# Patient Record
Sex: MALE | Race: WHITE | NOT HISPANIC OR LATINO | URBAN - METROPOLITAN AREA
[De-identification: names, ages, dates, MRNs, and addresses within clinical notes are randomized per-mention and may not be internally consistent; named-entity substitution may affect disease eponyms.]

---

## 2021-03-30 ENCOUNTER — EMERGENCY (EMERGENCY)
Facility: HOSPITAL | Age: 51
LOS: 1 days | Discharge: HOME | End: 2021-03-30
Attending: EMERGENCY MEDICINE
Payer: MEDICARE

## 2021-03-30 VITALS
WEIGHT: 169.98 LBS | HEART RATE: 84 BPM | DIASTOLIC BLOOD PRESSURE: 80 MMHG | TEMPERATURE: 99 F | HEIGHT: 71 IN | OXYGEN SATURATION: 99 % | SYSTOLIC BLOOD PRESSURE: 161 MMHG | RESPIRATION RATE: 18 BRPM

## 2021-03-30 VITALS
OXYGEN SATURATION: 99 % | DIASTOLIC BLOOD PRESSURE: 84 MMHG | HEART RATE: 70 BPM | RESPIRATION RATE: 18 BRPM | TEMPERATURE: 98 F | SYSTOLIC BLOOD PRESSURE: 170 MMHG

## 2021-03-30 LAB
ALBUMIN SERPL ELPH-MCNC: 4.8 G/DL — SIGNIFICANT CHANGE UP (ref 3.3–5)
ALP SERPL-CCNC: 59 U/L — SIGNIFICANT CHANGE UP (ref 40–120)
ALT FLD-CCNC: 13 U/L — SIGNIFICANT CHANGE UP (ref 10–45)
ANION GAP SERPL CALC-SCNC: 11 MMOL/L — SIGNIFICANT CHANGE UP (ref 5–17)
AST SERPL-CCNC: 26 U/L — SIGNIFICANT CHANGE UP (ref 10–40)
BASOPHILS # BLD AUTO: 0.05 K/UL — SIGNIFICANT CHANGE UP (ref 0–0.2)
BASOPHILS NFR BLD AUTO: 0.9 % — SIGNIFICANT CHANGE UP (ref 0–2)
BILIRUB SERPL-MCNC: 0.4 MG/DL — SIGNIFICANT CHANGE UP (ref 0.2–1.2)
BUN SERPL-MCNC: 20 MG/DL — SIGNIFICANT CHANGE UP (ref 7–23)
CALCIUM SERPL-MCNC: 9.7 MG/DL — SIGNIFICANT CHANGE UP (ref 8.4–10.5)
CHLORIDE SERPL-SCNC: 98 MMOL/L — SIGNIFICANT CHANGE UP (ref 96–108)
CO2 SERPL-SCNC: 29 MMOL/L — SIGNIFICANT CHANGE UP (ref 22–31)
CREAT SERPL-MCNC: 1.08 MG/DL — SIGNIFICANT CHANGE UP (ref 0.5–1.3)
EOSINOPHIL # BLD AUTO: 0.13 K/UL — SIGNIFICANT CHANGE UP (ref 0–0.5)
EOSINOPHIL NFR BLD AUTO: 2.2 % — SIGNIFICANT CHANGE UP (ref 0–6)
GLUCOSE SERPL-MCNC: 110 MG/DL — HIGH (ref 70–99)
HCT VFR BLD CALC: 37.5 % — LOW (ref 39–50)
HGB BLD-MCNC: 12.5 G/DL — LOW (ref 13–17)
IMM GRANULOCYTES NFR BLD AUTO: 0.3 % — SIGNIFICANT CHANGE UP (ref 0–1.5)
LYMPHOCYTES # BLD AUTO: 1.28 K/UL — SIGNIFICANT CHANGE UP (ref 1–3.3)
LYMPHOCYTES # BLD AUTO: 22.1 % — SIGNIFICANT CHANGE UP (ref 13–44)
MCHC RBC-ENTMCNC: 30.5 PG — SIGNIFICANT CHANGE UP (ref 27–34)
MCHC RBC-ENTMCNC: 33.3 GM/DL — SIGNIFICANT CHANGE UP (ref 32–36)
MCV RBC AUTO: 91.5 FL — SIGNIFICANT CHANGE UP (ref 80–100)
MONOCYTES # BLD AUTO: 0.43 K/UL — SIGNIFICANT CHANGE UP (ref 0–0.9)
MONOCYTES NFR BLD AUTO: 7.4 % — SIGNIFICANT CHANGE UP (ref 2–14)
NEUTROPHILS # BLD AUTO: 3.87 K/UL — SIGNIFICANT CHANGE UP (ref 1.8–7.4)
NEUTROPHILS NFR BLD AUTO: 67.1 % — SIGNIFICANT CHANGE UP (ref 43–77)
NRBC # BLD: 0 /100 WBCS — SIGNIFICANT CHANGE UP (ref 0–0)
PLATELET # BLD AUTO: 250 K/UL — SIGNIFICANT CHANGE UP (ref 150–400)
POTASSIUM SERPL-MCNC: 4.5 MMOL/L — SIGNIFICANT CHANGE UP (ref 3.5–5.3)
POTASSIUM SERPL-SCNC: 4.5 MMOL/L — SIGNIFICANT CHANGE UP (ref 3.5–5.3)
PROT SERPL-MCNC: 8.2 G/DL — SIGNIFICANT CHANGE UP (ref 6–8.3)
RBC # BLD: 4.1 M/UL — LOW (ref 4.2–5.8)
RBC # FLD: 12.4 % — SIGNIFICANT CHANGE UP (ref 10.3–14.5)
SODIUM SERPL-SCNC: 138 MMOL/L — SIGNIFICANT CHANGE UP (ref 135–145)
WBC # BLD: 5.78 K/UL — SIGNIFICANT CHANGE UP (ref 3.8–10.5)
WBC # FLD AUTO: 5.78 K/UL — SIGNIFICANT CHANGE UP (ref 3.8–10.5)

## 2021-03-30 PROCEDURE — 99284 EMERGENCY DEPT VISIT MOD MDM: CPT | Mod: 25

## 2021-03-30 PROCEDURE — 96375 TX/PRO/DX INJ NEW DRUG ADDON: CPT

## 2021-03-30 PROCEDURE — 99284 EMERGENCY DEPT VISIT MOD MDM: CPT | Mod: GC

## 2021-03-30 PROCEDURE — 96374 THER/PROPH/DIAG INJ IV PUSH: CPT

## 2021-03-30 PROCEDURE — 85025 COMPLETE CBC W/AUTO DIFF WBC: CPT

## 2021-03-30 PROCEDURE — 80053 COMPREHEN METABOLIC PANEL: CPT

## 2021-03-30 RX ORDER — METOCLOPRAMIDE HCL 10 MG
10 TABLET ORAL ONCE
Refills: 0 | Status: COMPLETED | OUTPATIENT
Start: 2021-03-30 | End: 2021-03-30

## 2021-03-30 RX ORDER — SODIUM CHLORIDE 9 MG/ML
1000 INJECTION INTRAMUSCULAR; INTRAVENOUS; SUBCUTANEOUS ONCE
Refills: 0 | Status: COMPLETED | OUTPATIENT
Start: 2021-03-30 | End: 2021-03-30

## 2021-03-30 RX ORDER — KETOROLAC TROMETHAMINE 30 MG/ML
15 SYRINGE (ML) INJECTION ONCE
Refills: 0 | Status: DISCONTINUED | OUTPATIENT
Start: 2021-03-30 | End: 2021-03-30

## 2021-03-30 RX ADMIN — Medication 15 MILLIGRAM(S): at 17:10

## 2021-03-30 RX ADMIN — SODIUM CHLORIDE 2000 MILLILITER(S): 9 INJECTION INTRAMUSCULAR; INTRAVENOUS; SUBCUTANEOUS at 20:06

## 2021-03-30 RX ADMIN — Medication 10 MILLIGRAM(S): at 20:06

## 2021-03-30 NOTE — ED PROVIDER NOTE - NSFOLLOWUPCLINICS_GEN_ALL_ED_FT
[facility]  Neurology  [address] [place] [address] [address]rd [place] [address]  Phone: [phone]  Fax:

## 2021-03-30 NOTE — ED ADULT NURSE NOTE - NSIMPLEMENTINTERV_GEN_ALL_ED
Implemented All Universal Safety Interventions:  [place] to call system. Call bell, personal items and telephone within reach. Instruct patient to call for assistance. Room bathroom lighting operational. Non-slip footwear when patient is off stretcher. Physically safe environment: no spills, clutter or unnecessary equipment. Stretcher in lowest position, wheels locked, appropriate side rails in place.

## 2021-03-30 NOTE — ED PROVIDER NOTE - PHYSICAL EXAMINATION
gen: well appearing  Mentation: AAO x 3  psych: mood appropriate  ENT: airway patent  Eyes: conjunctivae clear bilaterally  Cardio: RRR, no m/r/g  Resp: normal BS b/l  GI: s/nt/nd  : no CVA tenderness  Neuro: sensation and motor function intact, CN 2-12 intact, no nuchal rigidity  Skin: No evidence of rash  MSK: normal movement of all extremities  Lymph/Vasc: no LE edema

## 2021-03-30 NOTE — ED PROVIDER NOTE - OBJECTIVE STATEMENT
49 y/o M with no PMH presenting with headache for last 4 days. Headache is bandlike, feels worse if he shakes his head. States has been progressively worsening, no relief with tylenol at home. Denies any associated n/v, double vision/vision changes, neck stiffness, numbness/weakness. No LOC

## 2021-03-30 NOTE — ED ADULT NURSE NOTE - OBJECTIVE STATEMENT
Pt is a 50y M p/w headache x4 days. Pt reports headache worse on movement of eyes and head 8/10. Reports that he has never had headaches before. Pt has been taking tylenol outpatient but pain has been worsening since yesterday. Denies N/V/D, photophobia, chest pain, SOB, numbness, tingling. A&Ox4, neuro exam intact, PORTER, lungs clear, distal pulses intact, abdomen soft nontender, skin intact. One side rail up for safety, bed in lowest position, will continue to monitor.

## 2021-03-30 NOTE — ED PROVIDER NOTE - CLINICAL SUMMARY MEDICAL DECISION MAKING FREE TEXT BOX
DO Lissette PGY-2: 49 y/o M with no PMH presenting with headache, some relief with IV meds here. No red flags on exam, will DC home with supportive care

## 2021-03-30 NOTE — ED PROVIDER NOTE - RAPID ASSESSMENT
50 M presents with gradual onset Headache x4 days. Reports pain worsened with eye movement. States unable to focus. Of note, pt admits to sporadic episodes of anxiety attacks for the past 4 months. Denies head injury, fever, vomiting, visual changes, or COVID hx. Last endorsed Tylenol at 12pm.     Scribe Statement: Joan NULL Tiffany, attest that this documentation has been prepared under the direction and in the presence of Mo Berry) 50 M presents with gradual onset Headache x4 days. Reports pain worsened with eye movement. States unable to focus. Of note, pt admits to sporadic episodes of anxiety attacks for the past 4 months. Denies head injury, fever, vomiting, visual changes, or COVID hx. Last endorsed Tylenol at 12pm.     Scribe Statement: Joan NULL Tiffany, attest that this documentation has been prepared under the direction and in the presence of Mo Berry)  I, Dr. Berry saw patient as a rapid assessment initially via telemedicine encounter, rest of care performed by another attending, and rapid assessment documented by scribe in my presence. Receiving team will follow up on labs, analgesia, any clinical imaging, and perform reassessment and disposition of the patient as clinically indicated. All decisions regarding the progression of care will be made at their discretion.

## 2021-03-30 NOTE — ED PROVIDER NOTE - PATIENT PORTAL LINK FT
You can access the FollowMyHealth Patient Portal offered by [facility] by registering at the following website: [email or web address]. By joining [employer]’s FollowMyHealth portal, you will also be able to view your health information using other applications (apps) compatible with our system.

## 2021-03-30 NOTE — ED PROVIDER NOTE - ATTENDING CONTRIBUTION TO CARE
RGUJRAL 51yo male no pmhx present with HA x 4-5 days. HA is dull achy and frontal. No change in vision, n/v. States it hurts to move his eyes. Patient also states that he has been having increased anxiety and panic attacks that are self limiting. Denies any SI/HI. No hallucination. Denies any trauma, cough, URI, f/c, neck pain. No chest/abd pain.   On exam, Patient is awake,alert,oriented x 3. Patient is well appearing and in no acute distress. No sinus tenderness. Full EOMI. PERRL 4mm b/l reactive. Patient's chest is clear to ausculation, +s1s2. Abdomen is soft nd/nt +BS. Extremity with no swelling or calf tenderness. Pt neuro intact.   Pt qdoc and reviewed labs, states he feels slightly better and would like to leave. Discussed getting a CT Head, pt declined and states if his symptoms persist he will return to a hospital. Neuro follow up and return precautions discussed.

## 2022-04-15 PROBLEM — Z00.00 ENCOUNTER FOR PREVENTIVE HEALTH EXAMINATION: Status: ACTIVE | Noted: 2022-04-15

## 2022-04-25 ENCOUNTER — RESULT REVIEW (OUTPATIENT)
Age: 52
End: 2022-04-25

## 2022-04-25 ENCOUNTER — APPOINTMENT (OUTPATIENT)
Dept: SPINE | Facility: CLINIC | Age: 52
End: 2022-04-25
Payer: MEDICARE

## 2022-04-25 VITALS
HEART RATE: 95 BPM | SYSTOLIC BLOOD PRESSURE: 133 MMHG | BODY MASS INDEX: 23.1 KG/M2 | WEIGHT: 165 LBS | OXYGEN SATURATION: 67 % | HEIGHT: 71 IN | DIASTOLIC BLOOD PRESSURE: 74 MMHG

## 2022-04-25 DIAGNOSIS — Z72.0 TOBACCO USE: ICD-10-CM

## 2022-04-25 DIAGNOSIS — M47.12 OTHER SPONDYLOSIS WITH MYELOPATHY, CERVICAL REGION: ICD-10-CM

## 2022-04-25 DIAGNOSIS — Z78.9 OTHER SPECIFIED HEALTH STATUS: ICD-10-CM

## 2022-04-25 PROCEDURE — 99204 OFFICE O/P NEW MOD 45 MIN: CPT

## 2022-04-26 PROBLEM — Z78.9 NO PERTINENT PAST MEDICAL HISTORY: Status: RESOLVED | Noted: 2022-04-25 | Resolved: 2022-04-26

## 2022-04-26 PROBLEM — Z72.0 USE OF NICOTINE CONTAINING SUBSTANCE IN COMBUSTION-FREE VAPORIZATION DEVICE: Status: ACTIVE | Noted: 2022-04-25

## 2022-04-26 RX ORDER — ACETAMINOPHEN 500 MG/1
500 TABLET, COATED ORAL
Refills: 0 | Status: ACTIVE | COMMUNITY

## 2022-04-29 ENCOUNTER — OUTPATIENT (OUTPATIENT)
Dept: OUTPATIENT SERVICES | Facility: HOSPITAL | Age: 52
LOS: 1 days | End: 2022-04-29
Payer: MEDICARE

## 2022-04-29 VITALS
DIASTOLIC BLOOD PRESSURE: 81 MMHG | SYSTOLIC BLOOD PRESSURE: 133 MMHG | HEIGHT: 71 IN | RESPIRATION RATE: 20 BRPM | OXYGEN SATURATION: 100 % | HEART RATE: 70 BPM | TEMPERATURE: 98 F | WEIGHT: 160.94 LBS

## 2022-04-29 DIAGNOSIS — G95.9 DISEASE OF SPINAL CORD, UNSPECIFIED: ICD-10-CM

## 2022-04-29 DIAGNOSIS — Z98.890 OTHER SPECIFIED POSTPROCEDURAL STATES: Chronic | ICD-10-CM

## 2022-04-29 DIAGNOSIS — Z29.9 ENCOUNTER FOR PROPHYLACTIC MEASURES, UNSPECIFIED: ICD-10-CM

## 2022-04-29 DIAGNOSIS — Z01.818 ENCOUNTER FOR OTHER PREPROCEDURAL EXAMINATION: ICD-10-CM

## 2022-04-29 LAB
ANION GAP SERPL CALC-SCNC: 12 MMOL/L — SIGNIFICANT CHANGE UP (ref 5–17)
BLD GP AB SCN SERPL QL: NEGATIVE — SIGNIFICANT CHANGE UP
BUN SERPL-MCNC: 19 MG/DL — SIGNIFICANT CHANGE UP (ref 7–23)
CALCIUM SERPL-MCNC: 9.6 MG/DL — SIGNIFICANT CHANGE UP (ref 8.4–10.5)
CHLORIDE SERPL-SCNC: 101 MMOL/L — SIGNIFICANT CHANGE UP (ref 96–108)
CO2 SERPL-SCNC: 27 MMOL/L — SIGNIFICANT CHANGE UP (ref 22–31)
CREAT SERPL-MCNC: 0.96 MG/DL — SIGNIFICANT CHANGE UP (ref 0.5–1.3)
EGFR: 95 ML/MIN/1.73M2 — SIGNIFICANT CHANGE UP
GLUCOSE SERPL-MCNC: 116 MG/DL — HIGH (ref 70–99)
HCT VFR BLD CALC: 39 % — SIGNIFICANT CHANGE UP (ref 39–50)
HCV AB S/CO SERPL IA: 0.49 S/CO — SIGNIFICANT CHANGE UP (ref 0–0.99)
HCV AB SERPL-IMP: SIGNIFICANT CHANGE UP
HGB BLD-MCNC: 12.9 G/DL — LOW (ref 13–17)
HIV 1+2 AB+HIV1 P24 AG SERPL QL IA: SIGNIFICANT CHANGE UP
MCHC RBC-ENTMCNC: 29.9 PG — SIGNIFICANT CHANGE UP (ref 27–34)
MCHC RBC-ENTMCNC: 33.1 GM/DL — SIGNIFICANT CHANGE UP (ref 32–36)
MCV RBC AUTO: 90.3 FL — SIGNIFICANT CHANGE UP (ref 80–100)
MRSA PCR RESULT.: SIGNIFICANT CHANGE UP
NRBC # BLD: 0 /100 WBCS — SIGNIFICANT CHANGE UP (ref 0–0)
PLATELET # BLD AUTO: 243 K/UL — SIGNIFICANT CHANGE UP (ref 150–400)
POTASSIUM SERPL-MCNC: 4 MMOL/L — SIGNIFICANT CHANGE UP (ref 3.5–5.3)
POTASSIUM SERPL-SCNC: 4 MMOL/L — SIGNIFICANT CHANGE UP (ref 3.5–5.3)
RBC # BLD: 4.32 M/UL — SIGNIFICANT CHANGE UP (ref 4.2–5.8)
RBC # FLD: 12.2 % — SIGNIFICANT CHANGE UP (ref 10.3–14.5)
RH IG SCN BLD-IMP: POSITIVE — SIGNIFICANT CHANGE UP
S AUREUS DNA NOSE QL NAA+PROBE: DETECTED
SODIUM SERPL-SCNC: 140 MMOL/L — SIGNIFICANT CHANGE UP (ref 135–145)
WBC # BLD: 4.68 K/UL — SIGNIFICANT CHANGE UP (ref 3.8–10.5)
WBC # FLD AUTO: 4.68 K/UL — SIGNIFICANT CHANGE UP (ref 3.8–10.5)

## 2022-04-29 PROCEDURE — 87389 HIV-1 AG W/HIV-1&-2 AB AG IA: CPT

## 2022-04-29 PROCEDURE — 80048 BASIC METABOLIC PNL TOTAL CA: CPT

## 2022-04-29 PROCEDURE — 86900 BLOOD TYPING SEROLOGIC ABO: CPT

## 2022-04-29 PROCEDURE — G0463: CPT

## 2022-04-29 PROCEDURE — 36415 COLL VENOUS BLD VENIPUNCTURE: CPT

## 2022-04-29 PROCEDURE — 86850 RBC ANTIBODY SCREEN: CPT

## 2022-04-29 PROCEDURE — 87640 STAPH A DNA AMP PROBE: CPT

## 2022-04-29 PROCEDURE — 86803 HEPATITIS C AB TEST: CPT

## 2022-04-29 PROCEDURE — 87641 MR-STAPH DNA AMP PROBE: CPT

## 2022-04-29 PROCEDURE — 85027 COMPLETE CBC AUTOMATED: CPT

## 2022-04-29 PROCEDURE — 86901 BLOOD TYPING SEROLOGIC RH(D): CPT

## 2022-04-29 NOTE — H&P PST ADULT - ASSESSMENT
PAULI VTE 2.0 SCORE [CLOT updated 2019]    AGE RELATED RISK FACTORS                                                       MOBILITY RELATED FACTORS  [x ] Age 41-60 years                                            (1 Point)                    [ ] Bed rest                                                        (1 Point)  [ ] Age: 61-74 years                                           (2 Points)                  [ ] Plaster cast                                                   (2 Points)  [ ] Age= 75 years                                              (3 Points)                    [ ] Bed bound for more than 72 hours                 (2 Points)    DISEASE RELATED RISK FACTORS                                               GENDER SPECIFIC FACTORS  [ ] Edema in the lower extremities                       (1 Point)              [ ] Pregnancy                                                     (1 Point)  [ ] Varicose veins                                               (1 Point)                     [ ] Post-partum < 6 weeks                                   (1 Point)             [ ] BMI > 25 Kg/m2                                            (1 Point)                     [ ] Hormonal therapy  or oral contraception          (1 Point)                 [ ] Sepsis (in the previous month)                        (1 Point)               [ ] History of pregnancy complications                 (1 point)  [ ] Pneumonia or serious lung disease                                               [ ] Unexplained or recurrent                     (1 Point)           (in the previous month)                               (1 Point)  [ ] Abnormal pulmonary function test                     (1 Point)                 SURGERY RELATED RISK FACTORS  [ ] Acute myocardial infarction                              (1 Point)               [ ]  Section                                             (1 Point)  [ ] Congestive heart failure (in the previous month)  (1 Point)      [ ] Minor surgery                                                  (1 Point)   [ ] Inflammatory bowel disease                             (1 Point)               [ ] Arthroscopic surgery                                        (2 Points)  [ ] Central venous access                                      (2 Points)                [x ] General surgery lasting more than 45 minutes (2 points)  [ ] Malignancy- Present or previous                   (2 Points)                [ ] Elective arthroplasty                                         (5 points)    [ ] Stroke (in the previous month)                          (5 Points)                                                                                                                                                           HEMATOLOGY RELATED FACTORS                                                 TRAUMA RELATED RISK FACTORS  [ ] Prior episodes of VTE                                     (3 Points)                [ ] Fracture of the hip, pelvis, or leg                       (5 Points)  [ ] Positive family history for VTE                         (3 Points)             [ ] Acute spinal cord injury (in the previous month)  (5 Points)  [ ] Prothrombin 24490 A                                     (3 Points)               [ ] Paralysis  (less than 1 month)                             (5 Points)  [ ] Factor V Leiden                                             (3 Points)                  [ ] Multiple Trauma within 1 month                        (5 Points)  [ ] Lupus anticoagulants                                     (3 Points)                                                           [ ] Anticardiolipin antibodies                               (3 Points)                                                       [ ] High homocysteine in the blood                      (3 Points)                                             [ ] Other congenital or acquired thrombophilia      (3 Points)                                                [ ] Heparin induced thrombocytopenia                  (3 Points)                                     Total Score [   3      ]

## 2022-04-29 NOTE — H&P PST ADULT - NSANTHOSAYNRD_GEN_A_CORE
16-Aug-2018 No. TED screening performed.  STOP BANG Legend: 0-2 = LOW Risk; 3-4 = INTERMEDIATE Risk; 5-8 = HIGH Risk

## 2022-04-29 NOTE — H&P PST ADULT - NSICDXPASTMEDICALHX_GEN_ALL_CORE_FT
PAST MEDICAL HISTORY:  Osteomyelitis of vertebra, lumbar region x 12 yrs    Vapes nicotine containing substance

## 2022-04-29 NOTE — H&P PST ADULT - REASON FOR ADMISSION
I have herniated disc in neck  GOAL: to have better range of motion in right arm with better strength

## 2022-04-29 NOTE — H&P PST ADULT - HISTORY OF PRESENT ILLNESS
52 yo male  with muscle atrophy, and decreased ROM on right UE and bicep, and tingling, MRI demonstrates spinal stenosis in cervical spine. now for surgical intervention. pT s/p lumbar laminectomy x5 with course of osteomyelitis resulting from injury with construction nail.           50 yo male  with muscle atrophy, and decreased ROM on right UE and bicep, and tingling, MRI demonstrates spinal stenosis in cervical spine. now for surgical intervention. pT s/p lumbar laminectomy x5 with course of osteomyelitis resulting from injury with construction nail.      COVID swab 5/8/22 [place]

## 2022-04-29 NOTE — H&P PST ADULT - NSICDXPASTSURGICALHX_GEN_ALL_CORE_FT
PAST SURGICAL HISTORY:  S/P left knee arthroscopy bilateral in 2010    S/P lumbar laminectomy x5

## 2022-05-02 ENCOUNTER — OUTPATIENT (OUTPATIENT)
Dept: OUTPATIENT SERVICES | Facility: HOSPITAL | Age: 52
LOS: 1 days | End: 2022-05-02
Payer: MEDICARE

## 2022-05-02 ENCOUNTER — APPOINTMENT (OUTPATIENT)
Dept: RADIOLOGY | Facility: CLINIC | Age: 52
End: 2022-05-02
Payer: MEDICARE

## 2022-05-02 ENCOUNTER — APPOINTMENT (OUTPATIENT)
Dept: CT IMAGING | Facility: CLINIC | Age: 52
End: 2022-05-02
Payer: MEDICARE

## 2022-05-02 DIAGNOSIS — Z98.890 OTHER SPECIFIED POSTPROCEDURAL STATES: Chronic | ICD-10-CM

## 2022-05-02 DIAGNOSIS — G95.9 DISEASE OF SPINAL CORD, UNSPECIFIED: ICD-10-CM

## 2022-05-02 PROBLEM — M46.26 OSTEOMYELITIS OF VERTEBRA, LUMBAR REGION: Chronic | Status: ACTIVE | Noted: 2022-04-29

## 2022-05-02 PROBLEM — Z72.0 TOBACCO USE: Chronic | Status: ACTIVE | Noted: 2022-04-29

## 2022-05-02 PROCEDURE — G1004: CPT

## 2022-05-02 PROCEDURE — 72052 X-RAY EXAM NECK SPINE 6/>VWS: CPT | Mod: 26

## 2022-05-02 PROCEDURE — 72125 CT NECK SPINE W/O DYE: CPT | Mod: 26,ME

## 2022-05-02 PROCEDURE — 72052 X-RAY EXAM NECK SPINE 6/>VWS: CPT

## 2022-05-02 PROCEDURE — 72125 CT NECK SPINE W/O DYE: CPT | Mod: ME

## 2022-05-03 ENCOUNTER — NON-APPOINTMENT (OUTPATIENT)
Age: 52
End: 2022-05-03

## 2022-05-04 NOTE — PHYSICAL EXAM
[Person] : oriented to person [Place] : oriented to place [Time] : oriented to time [Short Term Intact] : short term memory intact [Remote Intact] : remote memory intact [Span Intact] : the attention span was normal [Concentration Intact] : normal concentrating ability [Fluency] : fluency intact [Comprehension] : comprehension intact [Current Events] : adequate knowledge of current events [Past History] : adequate knowledge of personal past history [Vocabulary] : adequate range of vocabulary [Cranial Nerves Optic (II)] : visual acuity intact bilaterally,  pupils equal round and reactive to light [Cranial Nerves Oculomotor (III)] : extraocular motion intact [Cranial Nerves Trigeminal (V)] : facial sensation intact symmetrically [Cranial Nerves Facial (VII)] : face symmetrical [Cranial Nerves Vestibulocochlear (VIII)] : hearing was intact bilaterally [Cranial Nerves Glossopharyngeal (IX)] : tongue and palate midline [Cranial Nerves Accessory (XI - Cranial And Spinal)] : head turning and shoulder shrug symmetric [Cranial Nerves Hypoglossal (XII)] : there was no tongue deviation with protrusion [Motor Tone] : muscle tone was normal in all four extremities [Motor Strength] : muscle strength was normal in all four extremities [No Muscle Atrophy] : normal bulk in all four extremities [Sensation Tactile Decrease] : light touch was intact [Abnormal Walk] : normal gait [Balance] : balance was intact [2+] : Ankle jerk left 2+ [Past-pointing] : there was no past-pointing [Tremor] : no tremor present [Plantar Reflex Right Only] : normal on the right [Plantar Reflex Left Only] : normal on the left [Henley] : Henley's sign was not demonstrated [FreeTextEntry6] : right  and proximal lower extremity weakness bilateral 4/5 [L'Hermitte's] : neck flexion did not produce tingling down the spine/arms [Spurling's - Opposite Side] : Negative Spurling's on opposite side [Spurling's Same Side] : Negative Spurling's on same side

## 2022-05-04 NOTE — HISTORY OF PRESENT ILLNESS
[> 3 months] : more  than 3 months [FreeTextEntry1] : Right arm weakness [de-identified] : Mr. Quintero is a 51 year old gentleman here for a neurological evaluation. He presents today with limited ROM of right arm and weakness for two years. Weakness of right arm has gotten progressively worse over the last year. He has difficulty putting on his belt secondary to limited range of motion in right arm. He has right deltoid and bicep atrophy. Ambulates independently without any assistive devices. Pt reports occasional neck pain. Takes Tylenol as needed for pain. No non surgical treatments has been initiated. Denies any balance issues, urinary or bowel incontinence. Works in construction. Vaps nicotine every day.  Cervical MRI reviewed today. MRI scan shows severe spinal stenosis at C3-C6 secondary to disc osteophyte complexes. There is evidence of T2 signal change in the cord.

## 2022-05-04 NOTE — CONSULT LETTER
[Dear  ___] : Dear  [unfilled], [Consult Letter:] : I had the pleasure of evaluating your patient, [unfilled]. [Please see my note below.] : Please see my note below. [Consult Closing:] : Thank you very much for allowing me to participate in the care of this patient.  If you have any questions, please do not hesitate to contact me. [Sincerely,] : Sincerely, [FreeTextEntry3] : Gatito Bejarano MD\par Chief of Neurosurgery [facility]\par [facility]\par

## 2022-05-04 NOTE — ASSESSMENT
[FreeTextEntry1] : 51 year old with progressively worsening right arm weakness. ACDF C4,5  TDR C3,4 , C5-6 surgery was discussed. Surgery was a result of shared decision making, all surgical options were reviewed. Risks related of surgery were discussed in detail - including but not limited to CSF leak, infection, nerve damage, temporary or permanent weakness or numbness, hemorrhage, cardiac events, and rarely even death, among others. Repair of CSF leak explained. An explanation of hardware/instrumentation that will be placed in the spine was reviewed and understood. It was discussed surgery will stop neurological impairment from worsening. Smoking cessation was discussed. CT Scan and Xray Flex/Ext of Cervical spine ordered for surgical planning.  I evaluated and examined this patient along with the nurse practitioner and we agreed on her plan of care.\par \par \par CC\par Dr. Se Daniels

## 2022-05-04 NOTE — REASON FOR VISIT
[New Patient Visit] : a new patient visit [Referred By: _________] : Patient was referred by TAMIKO [Other: _____] : [unfilled] [FreeTextEntry1] : right arm weakness and limited ROM

## 2022-05-08 ENCOUNTER — OUTPATIENT (OUTPATIENT)
Dept: OUTPATIENT SERVICES | Facility: HOSPITAL | Age: 52
LOS: 1 days | End: 2022-05-08

## 2022-05-08 DIAGNOSIS — Z11.52 ENCOUNTER FOR SCREENING FOR COVID-19: ICD-10-CM

## 2022-05-08 DIAGNOSIS — Z98.890 OTHER SPECIFIED POSTPROCEDURAL STATES: Chronic | ICD-10-CM

## 2022-05-08 LAB — SARS-COV-2 RNA SPEC QL NAA+PROBE: SIGNIFICANT CHANGE UP

## 2022-05-10 ENCOUNTER — TRANSCRIPTION ENCOUNTER (OUTPATIENT)
Age: 52
End: 2022-05-10

## 2022-05-11 ENCOUNTER — INPATIENT (INPATIENT)
Facility: HOSPITAL | Age: 52
LOS: 0 days | Discharge: HOME | DRG: 472 | End: 2022-05-12
Attending: NEUROLOGICAL SURGERY | Admitting: NEUROLOGICAL SURGERY
Payer: COMMERCIAL

## 2022-05-11 ENCOUNTER — APPOINTMENT (OUTPATIENT)
Dept: SPINE | Facility: HOSPITAL | Age: 52
End: 2022-05-11

## 2022-05-11 VITALS
HEART RATE: 51 BPM | WEIGHT: 160.94 LBS | HEIGHT: 71 IN | SYSTOLIC BLOOD PRESSURE: 119 MMHG | DIASTOLIC BLOOD PRESSURE: 58 MMHG | OXYGEN SATURATION: 98 % | TEMPERATURE: 98 F | RESPIRATION RATE: 18 BRPM

## 2022-05-11 DIAGNOSIS — Z98.890 OTHER SPECIFIED POSTPROCEDURAL STATES: Chronic | ICD-10-CM

## 2022-05-11 DIAGNOSIS — G95.9 DISEASE OF SPINAL CORD, UNSPECIFIED: ICD-10-CM

## 2022-05-11 LAB
ANION GAP SERPL CALC-SCNC: 15 MMOL/L — SIGNIFICANT CHANGE UP (ref 5–17)
BASOPHILS # BLD AUTO: 0.02 K/UL — SIGNIFICANT CHANGE UP (ref 0–0.2)
BASOPHILS NFR BLD AUTO: 0.3 % — SIGNIFICANT CHANGE UP (ref 0–2)
BUN SERPL-MCNC: 12 MG/DL — SIGNIFICANT CHANGE UP (ref 7–23)
CALCIUM SERPL-MCNC: 8.9 MG/DL — SIGNIFICANT CHANGE UP (ref 8.4–10.5)
CHLORIDE SERPL-SCNC: 101 MMOL/L — SIGNIFICANT CHANGE UP (ref 96–108)
CO2 SERPL-SCNC: 23 MMOL/L — SIGNIFICANT CHANGE UP (ref 22–31)
CREAT SERPL-MCNC: 0.95 MG/DL — SIGNIFICANT CHANGE UP (ref 0.5–1.3)
EGFR: 96 ML/MIN/1.73M2 — SIGNIFICANT CHANGE UP
EOSINOPHIL # BLD AUTO: 0.01 K/UL — SIGNIFICANT CHANGE UP (ref 0–0.5)
EOSINOPHIL NFR BLD AUTO: 0.1 % — SIGNIFICANT CHANGE UP (ref 0–6)
GLUCOSE SERPL-MCNC: 171 MG/DL — HIGH (ref 70–99)
HCT VFR BLD CALC: 34 % — LOW (ref 39–50)
HGB BLD-MCNC: 11.7 G/DL — LOW (ref 13–17)
IMM GRANULOCYTES NFR BLD AUTO: 0.5 % — SIGNIFICANT CHANGE UP (ref 0–1.5)
LYMPHOCYTES # BLD AUTO: 0.45 K/UL — LOW (ref 1–3.3)
LYMPHOCYTES # BLD AUTO: 5.7 % — LOW (ref 13–44)
MCHC RBC-ENTMCNC: 30.1 PG — SIGNIFICANT CHANGE UP (ref 27–34)
MCHC RBC-ENTMCNC: 34.4 GM/DL — SIGNIFICANT CHANGE UP (ref 32–36)
MCV RBC AUTO: 87.4 FL — SIGNIFICANT CHANGE UP (ref 80–100)
MONOCYTES # BLD AUTO: 0.08 K/UL — SIGNIFICANT CHANGE UP (ref 0–0.9)
MONOCYTES NFR BLD AUTO: 1 % — LOW (ref 2–14)
NEUTROPHILS # BLD AUTO: 7.33 K/UL — SIGNIFICANT CHANGE UP (ref 1.8–7.4)
NEUTROPHILS NFR BLD AUTO: 92.4 % — HIGH (ref 43–77)
NRBC # BLD: 0 /100 WBCS — SIGNIFICANT CHANGE UP (ref 0–0)
PLATELET # BLD AUTO: 233 K/UL — SIGNIFICANT CHANGE UP (ref 150–400)
POTASSIUM SERPL-MCNC: 3.4 MMOL/L — LOW (ref 3.5–5.3)
POTASSIUM SERPL-SCNC: 3.4 MMOL/L — LOW (ref 3.5–5.3)
RBC # BLD: 3.89 M/UL — LOW (ref 4.2–5.8)
RBC # FLD: 12.1 % — SIGNIFICANT CHANGE UP (ref 10.3–14.5)
RH IG SCN BLD-IMP: POSITIVE — SIGNIFICANT CHANGE UP
SODIUM SERPL-SCNC: 139 MMOL/L — SIGNIFICANT CHANGE UP (ref 135–145)
WBC # BLD: 7.93 K/UL — SIGNIFICANT CHANGE UP (ref 3.8–10.5)
WBC # FLD AUTO: 7.93 K/UL — SIGNIFICANT CHANGE UP (ref 3.8–10.5)

## 2022-05-11 PROCEDURE — 22551 ARTHRD ANT NTRBDY CERVICAL: CPT | Mod: GC

## 2022-05-11 PROCEDURE — 22552 ARTHRD ANT NTRBD CERVICAL EA: CPT | Mod: GC

## 2022-05-11 PROCEDURE — 22845 INSERT SPINE FIXATION DEVICE: CPT | Mod: 59,GC

## 2022-05-11 PROCEDURE — 22853 INSJ BIOMECHANICAL DEVICE: CPT | Mod: GC

## 2022-05-11 DEVICE — IMPLANTABLE DEVICE: Type: IMPLANTABLE DEVICE | Status: FUNCTIONAL

## 2022-05-11 DEVICE — SURGIFLO MATRIX WITH THROMBIN KIT: Type: IMPLANTABLE DEVICE | Status: FUNCTIONAL

## 2022-05-11 DEVICE — CAGE FORTILINK TETRAFUSE 6 DEG 14.5X17X6MM: Type: IMPLANTABLE DEVICE | Status: FUNCTIONAL

## 2022-05-11 DEVICE — DISTRACTION PIN 14MM (YELLOW): Type: IMPLANTABLE DEVICE | Status: FUNCTIONAL

## 2022-05-11 DEVICE — SURGIFOAM PAD 8CM X 12.5CM X 10MM (100): Type: IMPLANTABLE DEVICE | Status: FUNCTIONAL

## 2022-05-11 DEVICE — KIT A-LINE 1LUM 20G X 12CM SAFE KIT: Type: IMPLANTABLE DEVICE | Status: FUNCTIONAL

## 2022-05-11 RX ORDER — LABETALOL HCL 100 MG
20 TABLET ORAL ONCE
Refills: 0 | Status: COMPLETED | OUTPATIENT
Start: 2022-05-11 | End: 2022-05-11

## 2022-05-11 RX ORDER — GABAPENTIN 400 MG/1
300 CAPSULE ORAL DAILY
Refills: 0 | Status: DISCONTINUED | OUTPATIENT
Start: 2022-05-11 | End: 2022-05-12

## 2022-05-11 RX ORDER — SODIUM CHLORIDE 9 MG/ML
1000 INJECTION INTRAMUSCULAR; INTRAVENOUS; SUBCUTANEOUS
Refills: 0 | Status: DISCONTINUED | OUTPATIENT
Start: 2022-05-11 | End: 2022-05-12

## 2022-05-11 RX ORDER — GABAPENTIN 400 MG/1
0 CAPSULE ORAL
Qty: 0 | Refills: 0 | DISCHARGE

## 2022-05-11 RX ORDER — DEXAMETHASONE 0.5 MG/5ML
4 ELIXIR ORAL EVERY 6 HOURS
Refills: 0 | Status: DISCONTINUED | OUTPATIENT
Start: 2022-05-11 | End: 2022-05-12

## 2022-05-11 RX ORDER — CEFAZOLIN SODIUM 1 G
2000 VIAL (EA) INJECTION EVERY 8 HOURS
Refills: 0 | Status: COMPLETED | OUTPATIENT
Start: 2022-05-11 | End: 2022-05-11

## 2022-05-11 RX ORDER — CEFAZOLIN SODIUM 1 G
2000 VIAL (EA) INJECTION ONCE
Refills: 0 | Status: COMPLETED | OUTPATIENT
Start: 2022-05-11 | End: 2022-05-11

## 2022-05-11 RX ORDER — LABETALOL HCL 100 MG
10 TABLET ORAL ONCE
Refills: 0 | Status: COMPLETED | OUTPATIENT
Start: 2022-05-11 | End: 2022-05-11

## 2022-05-11 RX ORDER — CHLORHEXIDINE GLUCONATE 213 G/1000ML
1 SOLUTION TOPICAL ONCE
Refills: 0 | Status: DISCONTINUED | OUTPATIENT
Start: 2022-05-11 | End: 2022-05-11

## 2022-05-11 RX ORDER — HYDROMORPHONE HYDROCHLORIDE 2 MG/ML
0.5 INJECTION INTRAMUSCULAR; INTRAVENOUS; SUBCUTANEOUS
Refills: 0 | Status: DISCONTINUED | OUTPATIENT
Start: 2022-05-11 | End: 2022-05-11

## 2022-05-11 RX ORDER — OXYCODONE HYDROCHLORIDE 5 MG/1
5 TABLET ORAL EVERY 6 HOURS
Refills: 0 | Status: DISCONTINUED | OUTPATIENT
Start: 2022-05-11 | End: 2022-05-12

## 2022-05-11 RX ORDER — ONDANSETRON 8 MG/1
4 TABLET, FILM COATED ORAL ONCE
Refills: 0 | Status: DISCONTINUED | OUTPATIENT
Start: 2022-05-11 | End: 2022-05-11

## 2022-05-11 RX ORDER — LIDOCAINE HCL 20 MG/ML
0.2 VIAL (ML) INJECTION ONCE
Refills: 0 | Status: DISCONTINUED | OUTPATIENT
Start: 2022-05-11 | End: 2022-05-11

## 2022-05-11 RX ORDER — ONDANSETRON 8 MG/1
4 TABLET, FILM COATED ORAL EVERY 6 HOURS
Refills: 0 | Status: DISCONTINUED | OUTPATIENT
Start: 2022-05-11 | End: 2022-05-12

## 2022-05-11 RX ORDER — SODIUM CHLORIDE 9 MG/ML
3 INJECTION INTRAMUSCULAR; INTRAVENOUS; SUBCUTANEOUS EVERY 8 HOURS
Refills: 0 | Status: DISCONTINUED | OUTPATIENT
Start: 2022-05-11 | End: 2022-05-11

## 2022-05-11 RX ADMIN — Medication 4 MILLIGRAM(S): at 14:53

## 2022-05-11 RX ADMIN — Medication 100 MILLIGRAM(S): at 23:46

## 2022-05-11 RX ADMIN — GABAPENTIN 300 MILLIGRAM(S): 400 CAPSULE ORAL at 16:02

## 2022-05-11 RX ADMIN — OXYCODONE HYDROCHLORIDE 5 MILLIGRAM(S): 5 TABLET ORAL at 18:31

## 2022-05-11 RX ADMIN — HYDROMORPHONE HYDROCHLORIDE 0.5 MILLIGRAM(S): 2 INJECTION INTRAMUSCULAR; INTRAVENOUS; SUBCUTANEOUS at 13:11

## 2022-05-11 RX ADMIN — HYDROMORPHONE HYDROCHLORIDE 0.5 MILLIGRAM(S): 2 INJECTION INTRAMUSCULAR; INTRAVENOUS; SUBCUTANEOUS at 12:54

## 2022-05-11 RX ADMIN — Medication 10 MILLIGRAM(S): at 11:50

## 2022-05-11 RX ADMIN — Medication 4 MILLIGRAM(S): at 19:59

## 2022-05-11 RX ADMIN — HYDROMORPHONE HYDROCHLORIDE 0.5 MILLIGRAM(S): 2 INJECTION INTRAMUSCULAR; INTRAVENOUS; SUBCUTANEOUS at 12:20

## 2022-05-11 RX ADMIN — OXYCODONE HYDROCHLORIDE 5 MILLIGRAM(S): 5 TABLET ORAL at 23:46

## 2022-05-11 RX ADMIN — SODIUM CHLORIDE 75 MILLILITER(S): 9 INJECTION INTRAMUSCULAR; INTRAVENOUS; SUBCUTANEOUS at 12:26

## 2022-05-11 RX ADMIN — Medication 20 MILLIGRAM(S): at 13:15

## 2022-05-11 RX ADMIN — HYDROMORPHONE HYDROCHLORIDE 0.5 MILLIGRAM(S): 2 INJECTION INTRAMUSCULAR; INTRAVENOUS; SUBCUTANEOUS at 12:35

## 2022-05-11 RX ADMIN — Medication 100 MILLIGRAM(S): at 16:01

## 2022-05-11 RX ADMIN — OXYCODONE HYDROCHLORIDE 5 MILLIGRAM(S): 5 TABLET ORAL at 17:47

## 2022-05-11 NOTE — PROGRESS NOTE ADULT - ASSESSMENT
Patient seen and examined at bedside s/p C3-6 anterior cervical discectomy and fusion, recovering well.  -PACU Floor, cleared after 6 hours.  -Q4 neurochecks  -Q4 vitals  -Pain control  -Advance diet as tolerated  -PT/OT  -Has Jamie GREENE Aline  -Dex 4q6  -Needs collar when OOB

## 2022-05-11 NOTE — PHYSICAL THERAPY INITIAL EVALUATION ADULT - ADDITIONAL COMMENTS
Per pt, he lives in a apt with wife (can assist). Has 8 steps to enter (+HR) and no steps inside. Reports being independent with functional mobility/ADLs without AD. +drive, +work ().

## 2022-05-11 NOTE — PROGRESS NOTE ADULT - SUBJECTIVE AND OBJECTIVE BOX
Patient seen and examined s/p C3-6 anterior cervical discectomy and fusion    AOx3, RDelt/Bi/Tri/HG 4+/5 (baseline 4/5), LUE 5/5, BLE 5/5, no drift, no Henley's, BLE 2 beats of clonus, SILT    T(C): 36.5 (05-11-22 @ 11:25), Max: 36.5 (05-11-22 @ 11:25)  HR: 85 (05-11-22 @ 13:15) (51 - 85)  BP: 162/79 (05-11-22 @ 13:15) (119/58 - 182/78)  RR: 14 (05-11-22 @ 13:15) (14 - 18)  SpO2: 100% (05-11-22 @ 13:15) (98% - 100%)                          11.7   7.93  )-----------( 233      ( 11 May 2022 12:24 )             34.0     05-11    139  |  101  |  12  ----------------------------<  171<H>  3.4<L>   |  23  |  0.95    Ca    8.9      11 May 2022 12:24              CAPILLARY BLOOD GLUCOSE

## 2022-05-11 NOTE — PRE-ANESTHESIA EVALUATION ADULT - NSANTHOSAYNRD_GEN_A_CORE
No. TED screening performed.  STOP BANG Legend: 0-2 = LOW Risk; 3-4 = INTERMEDIATE Risk; 5-8 = HIGH Risk

## 2022-05-11 NOTE — PHYSICAL THERAPY INITIAL EVALUATION ADULT - GENERAL OBSERVATIONS, REHAB EVAL
Rec'd semi-supine in bed, in NAD, +IV, +hemovac x1, +PACU monitoring. Agreeable to PT. RN cleared pt to be seen. S/p C3-6 ACDF 5/11. Cervical collar for OOB.

## 2022-05-11 NOTE — PHYSICAL THERAPY INITIAL EVALUATION ADULT - PERTINENT HX OF CURRENT PROBLEM, REHAB EVAL
50 yo male  with muscle atrophy, and decreased ROM on right UE and bicep, and tingling, MRI demonstrates spinal stenosis in cervical spine. now for surgical intervention. pT s/p lumbar laminectomy x5 with course of osteomyelitis resulting from injury with construction nail.

## 2022-05-11 NOTE — PHYSICAL THERAPY INITIAL EVALUATION ADULT - ACTIVE RANGE OF MOTION EXAMINATION, REHAB EVAL
SHANELL GONZALEZ WFL/Left UE Active ROM was WFL (within functional limits)/bilateral  lower extremity Active ROM was WFL (within functional limits)

## 2022-05-12 ENCOUNTER — TRANSCRIPTION ENCOUNTER (OUTPATIENT)
Age: 52
End: 2022-05-12

## 2022-05-12 VITALS — DIASTOLIC BLOOD PRESSURE: 71 MMHG | HEART RATE: 63 BPM | OXYGEN SATURATION: 99 % | SYSTOLIC BLOOD PRESSURE: 168 MMHG

## 2022-05-12 LAB
ANION GAP SERPL CALC-SCNC: 13 MMOL/L — SIGNIFICANT CHANGE UP (ref 5–17)
BASOPHILS # BLD AUTO: 0 K/UL — SIGNIFICANT CHANGE UP (ref 0–0.2)
BASOPHILS NFR BLD AUTO: 0 % — SIGNIFICANT CHANGE UP (ref 0–2)
BUN SERPL-MCNC: 13 MG/DL — SIGNIFICANT CHANGE UP (ref 7–23)
CALCIUM SERPL-MCNC: 8.9 MG/DL — SIGNIFICANT CHANGE UP (ref 8.4–10.5)
CHLORIDE SERPL-SCNC: 105 MMOL/L — SIGNIFICANT CHANGE UP (ref 96–108)
CO2 SERPL-SCNC: 24 MMOL/L — SIGNIFICANT CHANGE UP (ref 22–31)
CREAT SERPL-MCNC: 0.96 MG/DL — SIGNIFICANT CHANGE UP (ref 0.5–1.3)
EGFR: 95 ML/MIN/1.73M2 — SIGNIFICANT CHANGE UP
EOSINOPHIL # BLD AUTO: 0 K/UL — SIGNIFICANT CHANGE UP (ref 0–0.5)
EOSINOPHIL NFR BLD AUTO: 0 % — SIGNIFICANT CHANGE UP (ref 0–6)
GLUCOSE SERPL-MCNC: 156 MG/DL — HIGH (ref 70–99)
HCT VFR BLD CALC: 33.9 % — LOW (ref 39–50)
HGB BLD-MCNC: 11.5 G/DL — LOW (ref 13–17)
IMM GRANULOCYTES NFR BLD AUTO: 0.5 % — SIGNIFICANT CHANGE UP (ref 0–1.5)
LYMPHOCYTES # BLD AUTO: 0.52 K/UL — LOW (ref 1–3.3)
LYMPHOCYTES # BLD AUTO: 6.4 % — LOW (ref 13–44)
MCHC RBC-ENTMCNC: 30.3 PG — SIGNIFICANT CHANGE UP (ref 27–34)
MCHC RBC-ENTMCNC: 33.9 GM/DL — SIGNIFICANT CHANGE UP (ref 32–36)
MCV RBC AUTO: 89.2 FL — SIGNIFICANT CHANGE UP (ref 80–100)
MONOCYTES # BLD AUTO: 0.31 K/UL — SIGNIFICANT CHANGE UP (ref 0–0.9)
MONOCYTES NFR BLD AUTO: 3.8 % — SIGNIFICANT CHANGE UP (ref 2–14)
NEUTROPHILS # BLD AUTO: 7.28 K/UL — SIGNIFICANT CHANGE UP (ref 1.8–7.4)
NEUTROPHILS NFR BLD AUTO: 89.3 % — HIGH (ref 43–77)
NRBC # BLD: 0 /100 WBCS — SIGNIFICANT CHANGE UP (ref 0–0)
PLATELET # BLD AUTO: 230 K/UL — SIGNIFICANT CHANGE UP (ref 150–400)
POTASSIUM SERPL-MCNC: 3.9 MMOL/L — SIGNIFICANT CHANGE UP (ref 3.5–5.3)
POTASSIUM SERPL-SCNC: 3.9 MMOL/L — SIGNIFICANT CHANGE UP (ref 3.5–5.3)
RBC # BLD: 3.8 M/UL — LOW (ref 4.2–5.8)
RBC # FLD: 12.4 % — SIGNIFICANT CHANGE UP (ref 10.3–14.5)
SODIUM SERPL-SCNC: 142 MMOL/L — SIGNIFICANT CHANGE UP (ref 135–145)
WBC # BLD: 8.15 K/UL — SIGNIFICANT CHANGE UP (ref 3.8–10.5)
WBC # FLD AUTO: 8.15 K/UL — SIGNIFICANT CHANGE UP (ref 3.8–10.5)

## 2022-05-12 PROCEDURE — 80048 BASIC METABOLIC PNL TOTAL CA: CPT

## 2022-05-12 PROCEDURE — C1889: CPT

## 2022-05-12 PROCEDURE — 76000 FLUOROSCOPY <1 HR PHYS/QHP: CPT

## 2022-05-12 PROCEDURE — 22551 ARTHRD ANT NTRBDY CERVICAL: CPT

## 2022-05-12 PROCEDURE — U0005: CPT

## 2022-05-12 PROCEDURE — 22853 INSJ BIOMECHANICAL DEVICE: CPT

## 2022-05-12 PROCEDURE — 85025 COMPLETE CBC W/AUTO DIFF WBC: CPT

## 2022-05-12 PROCEDURE — C1769: CPT

## 2022-05-12 PROCEDURE — C9803: CPT

## 2022-05-12 PROCEDURE — 97161 PT EVAL LOW COMPLEX 20 MIN: CPT

## 2022-05-12 PROCEDURE — U0003: CPT

## 2022-05-12 PROCEDURE — C1713: CPT

## 2022-05-12 PROCEDURE — 22552 ARTHRD ANT NTRBD CERVICAL EA: CPT

## 2022-05-12 PROCEDURE — 20938 SP BONE AGRFT STRUCT ADD-ON: CPT

## 2022-05-12 PROCEDURE — 20931 SP BONE ALGRFT STRUCT ADD-ON: CPT

## 2022-05-12 RX ORDER — OXYCODONE HYDROCHLORIDE 5 MG/1
1 TABLET ORAL
Qty: 28 | Refills: 0
Start: 2022-05-12 | End: 2022-05-18

## 2022-05-12 RX ADMIN — Medication 4 MILLIGRAM(S): at 08:04

## 2022-05-12 RX ADMIN — Medication 4 MILLIGRAM(S): at 02:20

## 2022-05-12 RX ADMIN — OXYCODONE HYDROCHLORIDE 5 MILLIGRAM(S): 5 TABLET ORAL at 05:48

## 2022-05-12 RX ADMIN — OXYCODONE HYDROCHLORIDE 5 MILLIGRAM(S): 5 TABLET ORAL at 06:48

## 2022-05-12 RX ADMIN — OXYCODONE HYDROCHLORIDE 5 MILLIGRAM(S): 5 TABLET ORAL at 12:57

## 2022-05-12 RX ADMIN — OXYCODONE HYDROCHLORIDE 5 MILLIGRAM(S): 5 TABLET ORAL at 11:57

## 2022-05-12 RX ADMIN — GABAPENTIN 300 MILLIGRAM(S): 400 CAPSULE ORAL at 11:57

## 2022-05-12 RX ADMIN — OXYCODONE HYDROCHLORIDE 5 MILLIGRAM(S): 5 TABLET ORAL at 00:40

## 2022-05-12 NOTE — PROGRESS NOTE ADULT - SUBJECTIVE AND OBJECTIVE BOX
Post-op day # 1 s/p C3-6 ACDF     OVERNIGHT EVENTS: no acute event, would like to go home    Vital Signs Last 24 Hrs  T(C): 36.6 (12 May 2022 04:05), Max: 37.2 (11 May 2022 15:00)  T(F): 97.8 (12 May 2022 04:05), Max: 99 (11 May 2022 15:00)  HR: 64 (12 May 2022 04:05) (51 - 97)  BP: 158/74 (12 May 2022 04:05) (119/58 - 182/78)  BP(mean): 89 (11 May 2022 20:27) (89 - 124)  RR: 16 (12 May 2022 04:05) (14 - 18)  SpO2: 98% (12 May 2022 04:05) (96% - 100%)    I&O's Detail    11 May 2022 07:01  -  12 May 2022 07:00  --------------------------------------------------------  IN:    Oral Fluid: 60 mL    sodium chloride 0.9%: 450 mL  Total IN: 510 mL    OUT:    Accordian (mL): 75 mL    Indwelling Catheter - Urethral (mL): 2300 mL  Total OUT: 2375 mL    Total NET: -1865 mL        I&O's Summary    11 May 2022 07:01  -  12 May 2022 07:00  --------------------------------------------------------  IN: 510 mL / OUT: 2375 mL / NET: -1865 mL        PHYSICAL EXAM:  Neurological:    Motor exam:         [] Upper extremity                 D             B          T          WE       WF      HI                                               R         5/5        5/5        5/5       5/5     5/5       5/5                                               L          5/5        5/5        5/5       5/5     5/5       5/5         [] Lower extremity               HF            KF        KE         EHL        FHL                                               R        5/5        5/5        5/5       5/5         5/5                                               L         5/5        5/5       5/5       5/5          5/5                                                        [] warm well perfused; capillary refill <3 seconds     Sensation: [] intact to light touch  [] decreased:       Gait    Cardiovascular: Regular  Respiratory: Clear bilaterally  Gastrointestinal: Abd soft + BS non tender  Genitourinary:  Extremities: -C/C/E  Incision/Wound: Intact    TUBES/LINES:  [] CVC  [] A-line  [] Lumbar Drain  [] Ventriculostomy  [] Other    DIET:  [] NPO  [] Mechanical  [] Tube feeds    LABS:                        11.5   8.15  )-----------( 230      ( 12 May 2022 06:39 )             33.9     05-12    142  |  105  |  13  ----------------------------<  156<H>  3.9   |  24  |  0.96    Ca    8.9      12 May 2022 06:39              CAPILLARY BLOOD GLUCOSE              Drug Levels: [] N/A    CSF Analysis: [] N/A      Allergies    No Known Allergies    Intolerances      MEDICATIONS:  Antibiotics:    Neuro:  gabapentin 300 milliGRAM(s) Oral daily  ondansetron   Disintegrating Tablet 4 milliGRAM(s) Oral every 6 hours PRN  oxyCODONE    IR 5 milliGRAM(s) Oral every 6 hours PRN    Anticoagulation:    OTHER:  dexAMETHasone     Tablet 4 milliGRAM(s) Oral every 6 hours    IVF:    CULTURES:    RADIOLOGY & ADDITIONAL TESTS:         Post-op day # 1 s/p C3-6 ACDF     OVERNIGHT EVENTS: no acute event, would like to go home, patient stated that his pain/tingling symptoms are gone    Vital Signs Last 24 Hrs  T(C): 36.6 (12 May 2022 04:05), Max: 37.2 (11 May 2022 15:00)  T(F): 97.8 (12 May 2022 04:05), Max: 99 (11 May 2022 15:00)  HR: 64 (12 May 2022 04:05) (51 - 97)  BP: 158/74 (12 May 2022 04:05) (119/58 - 182/78)  BP(mean): 89 (11 May 2022 20:27) (89 - 124)  RR: 16 (12 May 2022 04:05) (14 - 18)  SpO2: 98% (12 May 2022 04:05) (96% - 100%)    I&O's Detail    11 May 2022 07:01  -  12 May 2022 07:00  --------------------------------------------------------  IN:    Oral Fluid: 60 mL    sodium chloride 0.9%: 450 mL  Total IN: 510 mL    OUT:    Accordian (mL): 75 mL    Indwelling Catheter - Urethral (mL): 2300 mL  Total OUT: 2375 mL    Total NET: -1865 mL        I&O's Summary    11 May 2022 07:01  -  12 May 2022 07:00  --------------------------------------------------------  IN: 510 mL / OUT: 2375 mL / NET: -1865 mL        PHYSICAL EXAM:  Neurological:    Motor exam:         [x] Upper extremity                 D             B          T          WE       WF      HI                                               R         5/5        5/5        5/5       5/5     5/5       5/5                                               L          5/5        5/5        5/5       5/5     5/5       5/5         [x] Lower extremity               HF            KF        KE         EHL        FHL                                               R        5/5        5/5        5/5       5/5         5/5                                               L         5/5        5/5       5/5       5/5          5/5                                                    Sensation: [x] intact to light touch  [] decreased:       Gait: intact    Cardiovascular: Regular  Respiratory: Clear bilaterally  Gastrointestinal: Abd soft + BS non tender  Genitourinary:  Extremities: -C/C/E  Incision/Wound: Intact, HMV d/jaime    TUBES/LINES:  [] CVC  [] A-line  [] Lumbar Drain  [] Ventriculostomy  [] Other    DIET:  [] NPO  [] Mechanical  [] Tube feeds    LABS:                        11.5   8.15  )-----------( 230      ( 12 May 2022 06:39 )             33.9     05-12    142  |  105  |  13  ----------------------------<  156<H>  3.9   |  24  |  0.96    Ca    8.9      12 May 2022 06:39              CAPILLARY BLOOD GLUCOSE              Drug Levels: [] N/A    CSF Analysis: [] N/A      Allergies    No Known Allergies    Intolerances      MEDICATIONS:  Antibiotics:    Neuro:  gabapentin 300 milliGRAM(s) Oral daily  ondansetron   Disintegrating Tablet 4 milliGRAM(s) Oral every 6 hours PRN  oxyCODONE    IR 5 milliGRAM(s) Oral every 6 hours PRN    Anticoagulation:    OTHER:  dexAMETHasone     Tablet 4 milliGRAM(s) Oral every 6 hours    IVF:    CULTURES:    RADIOLOGY & ADDITIONAL TESTS:

## 2022-05-12 NOTE — DISCHARGE NOTE NURSING/CASE MANAGEMENT/SOCIAL WORK - PATIENT PORTAL LINK FT
You can access the FollowMyHealth Patient Portal offered by [facility] by registering at the following website: [email or web address]. By joining [employer]’s FollowMyHealth portal, you will also be able to view your health information using other applications (apps) compatible with our system.

## 2022-05-12 NOTE — DISCHARGE NOTE PROVIDER - NSDCFUADDINST_GEN_ALL_CORE_FT
Please keep incision clean and dry, do not submerge wound in water for prolonged periods of time, pat dry after showering, and do not use any creams or ointments to incision.  Please do not engage in strenuous activity, heavy lifting, drive, or return to work or school until cleared by surgeon.  Please notify physician if fevers, bleeding, swelling, pain not relieved by medication, nability to tolerate foods or liquids.  No heavy lifting/straining, Showering allowed, Stairs allowed, Walking - Indoors allowed, Walking - Outdoors allowed, Follow Instructions Provided by your Surgical Team  Do not start any Motrin /Aspirin NSAIDS( Motrin, Advil.... until cleared by your neurosurgeon

## 2022-05-12 NOTE — PROGRESS NOTE ADULT - ASSESSMENT
ASSESSMENT:     50 yo male  with muscle atrophy, and decreased ROM on right UE and bicep, and tingling, MRI demonstrates spinal stenosis in cervical spine. now for surgical intervention. pT s/p lumbar laminectomy x5 with course of osteomyelitis resulting from injury with construction nail.      COVID swab 5/8/22 [facility]     (29 Apr 2022 07:11)    51y Male s/p    PLAN:  NEURO:      CARDIOVASCULAR:  Hemodynamically stable    PULMONARY:  Keep O2 sat > 94%  Continue incentive spirometer    RENAL:  Voidind  IVL    GI:  Tolerating diet  Bowel regimen    HEME:  H/H stable    ID: Afebrile    ENDO:    DVT PROPHYLAXIS:  [x] Venodynes                                [x] Heparin/Lovenox    FALL RISK:  [x] Low Risk                                    [] Impulsive    52 yo male  with muscle atrophy, and decreased ROM on right UE and bicep, and tingling, MRI demonstrates spinal stenosis in cervical spine. now for surgical intervention. pT s/p lumbar laminectomy x5 with course of osteomyelitis resulting from injury with construction nail.       51y Male Post-op day # 1 s/p C3-6 ACDF    PLAN:  NEURO:  Neuro checks q 4hrs  Oxycodone for pain   Patient is cleared by PT   Will d/c home on Medrol dose jacob      CARDIOVASCULAR:  Hemodynamically stable    PULMONARY:  Keep O2 sat > 94%  Continue incentive spirometer    RENAL:  Voiding   IVL    GI:  Tolerating Regular diet  Bowel regimen    HEME:  H/H stable    ID: Afebrile    ENDO:    DVT PROPHYLAXIS:  [x] Venodynes                                [x] Heparin/Lovenox    FALL RISK:  [x] Low Risk                                    [] Impulsive

## 2022-05-12 NOTE — DISCHARGE NOTE PROVIDER - NSDCCPCAREPLAN_GEN_ALL_CORE_FT
PRINCIPAL DISCHARGE DIAGNOSIS  Diagnosis: Cervical radiculopathy  Assessment and Plan of Treatment: S/p C3-6 ACDF  Neurologically stable  Will d/c home on Medrol dose pk and Oxy IR for pain, you can continue the Gabapentin and please follow up with Dr Bejarano in 2 weeks       PRINCIPAL DISCHARGE DIAGNOSIS  Diagnosis: Cervical radiculopathy  Assessment and Plan of Treatment: S/p C3-6 ACDF  Neurologically stable  Will send you home on Medrol dose pk and Oxy IR for pain, you can continue the Gabapentin and please follow up with Dr Bejarano in 2 weeks

## 2022-05-12 NOTE — DISCHARGE NOTE PROVIDER - NSDCMRMEDTOKEN_GEN_ALL_CORE_FT
gabapentin 300 mg oral capsule: orally once a day  oxyCODONE 5 mg oral tablet: 1 tab(s) orally every 6 hours, As needed, Moderate Pain (4 - 6) MDD:4   gabapentin 300 mg oral capsule: orally once a day  Medrol Dosepak 4 mg oral tablet: AS DIRECTED  oxyCODONE 5 mg oral tablet: 1 tab(s) orally every 6 hours, As needed, Moderate Pain (4 - 6) MDD:4

## 2022-05-12 NOTE — DISCHARGE NOTE NURSING/CASE MANAGEMENT/SOCIAL WORK - NSDCPEFALRISK_GEN_ALL_CORE
For information on Fall & Injury Prevention, visit: [email or web address] OR  [email or web address] OR  https://www.cdc.gov/steadi/patient.html

## 2022-05-12 NOTE — DISCHARGE NOTE PROVIDER - CARE PROVIDER_API CALL
Gatito Bejarano (MD)  Neurosurgery  [address], [address]  [place] [address]  Phone: [phone]  Fax: [phone]  Follow Up Time:

## 2022-05-12 NOTE — DISCHARGE NOTE PROVIDER - HOSPITAL COURSE
52 yo male  with muscle atrophy, and decreased ROM on right UE and bicep, and tingling, MRI demonstrates spinal stenosis in cervical spine. now for surgical intervention. Pt s/p lumbar laminectomy x5 with course of osteomyelitis resulting from injury with construction nail.   Patient underwent C3-6 ACDF with no complication. He has been neurologically stable. Pt /ot saw him post-op and 50 yo male  with muscle atrophy, and decreased ROM on right UE and bicep, and tingling, MRI demonstrates spinal stenosis in cervical spine. now for surgical intervention. Pt s/p lumbar laminectomy x5 with course of osteomyelitis resulting from injury with construction nail.   Patient underwent C3-6 ACDF with no complication. He has been neurologically stable. Pt /ot saw him post-op and patient has no therapy needs

## 2022-05-12 NOTE — CHART NOTE - NSCHARTNOTEFT_GEN_A_CORE
CAPRINI SCORE [CLOT] Score on Admission for     AGE RELATED RISK FACTORS                                                       MOBILITY RELATED FACTORS  [ x] Age 41-60 years                                            (1 Point)                  [ ] Bed rest                                                        (1 Point)  [ ] Age: 61-74 years                                           (2 Points)                 [ ] Plaster cast                                                   (2 Points)  [ ] Age= 75 years                                              (3 Points)                 [ ] Bed bound for more than 72 hours                 (2 Points)    DISEASE RELATED RISK FACTORS                                               GENDER SPECIFIC FACTORS  [ ] Edema in the lower extremities                       (1 Point)                  [ ] Pregnancy                                                     (1 Point)  [ ] Varicose veins                                               (1 Point)                  [ ] Post-partum < 6 weeks                                   (1 Point)             [ ] BMI > 25 Kg/m2                                            (1 Point)                  [ ] Hormonal therapy  or oral contraception          (1 Point)                 [ ] Sepsis (in the previous month)                        (1 Point)                  [ ] History of pregnancy complications                 (1 point)  [ ] Pneumonia or serious lung disease                                               [ ] Unexplained or recurrent                     (1 Point)           (in the previous month)                               (1 Point)  [ ] Abnormal pulmonary function test                     (1 Point)                 SURGERY RELATED RISK FACTORS (include planned surgeries)  [ ] Acute myocardial infarction                              (1 Point)                 [ ]  Section                                             (1 Point)  [ ] Congestive heart failure (in the previous month)  (1 Point)         [ ] Minor surgery                                                  (1 Point)   [ ] Inflammatory bowel disease                             (1 Point)                 [ ] Arthroscopic surgery                                        (2 Points)  [ ] Central venous access                                      (2 Points)                [ x] General surgery lasting more than 45 minutes   (2 Points)       [ ] Stroke (in the previous month)                          (5 Points)               [ ] Elective arthroplasty                                         (5 Points)            [ ] current or past malignancy                              (2 Points)                                                                                                       HEMATOLOGY RELATED FACTORS                                                 TRAUMA RELATED RISK FACTORS  [ ] Prior episodes of VTE                                     (3 Points)                [ ] Fracture of the hip, pelvis, or leg                       (5 Points)  [ ] Positive family history for VTE                         (3 Points)                 [ ] Acute spinal cord injury (in the previous month)  (5 Points)  [ ] Prothrombin 05893 A                                     (3 Points)                 [ ] Paralysis  (less than 1 month)                             (5 Points)  [ ] Factor V Leiden                                             (3 Points)                  [ ] Multiple Trauma within 1 month                        (5 Points)  [ ] Lupus anticoagulants                                     (3 Points)                                                           [ ] Anticardiolipin antibodies                               (3 Points)                                                       [ ] High homocysteine in the blood                      (3 Points)                                             [ ] Other congenital or acquired thrombophilia      (3 Points)                                                [ ] Heparin induced thrombocytopenia                  (3 Points)                                          Total Score [      3    ]    Risk:  Very low 0   Low 1 to 2   Moderate 3 to 4   High =5       VTE Prophylasix Recommednations:  [ ] mechanical pneumatic compression devices                                      [ ] contraindicated: _____________________  [ ] chemo prophylasix                                                                                   [ ] contraindicated _____________________    **** HIGH LIKELIHOOD DVT PRESENT ON ADMISSION  [ ] (please order LE dopplers within 24 hours of admission)

## 2022-05-26 ENCOUNTER — APPOINTMENT (OUTPATIENT)
Dept: SPINE | Facility: CLINIC | Age: 52
End: 2022-05-26
Payer: MEDICARE

## 2022-05-26 VITALS
SYSTOLIC BLOOD PRESSURE: 131 MMHG | DIASTOLIC BLOOD PRESSURE: 74 MMHG | OXYGEN SATURATION: 98 % | HEIGHT: 71 IN | BODY MASS INDEX: 23.1 KG/M2 | HEART RATE: 76 BPM | WEIGHT: 165 LBS

## 2022-05-26 DIAGNOSIS — G95.9 DISEASE OF SPINAL CORD, UNSPECIFIED: ICD-10-CM

## 2022-05-26 PROCEDURE — 99024 POSTOP FOLLOW-UP VISIT: CPT

## 2022-05-26 NOTE — HISTORY OF PRESENT ILLNESS
[FreeTextEntry1] : RONALD YUSUF is a 51 year old gentleman who had presented in April with complaints of progressive weakness in his right arm for the past 2 years.  He had difficulty with certain tasks such as putting on his belt.  The patient presented with right deltoid and bicep atrophy.  His cervical spine MRI revealed severe spinal canal stenosis at C3-C6 secondary to disc osteophyte complexes.  There was evidence of T2 signal change in the cord.  The patient underwent a C3-C4, C4-C5, and C5-C6 ACDF on -5/11/2022.\par

## 2022-05-26 NOTE — REASON FOR VISIT
[de-identified] : 1. C3-C4, C4-C5, C5-C6 anterior cervical diskectomy with allograft autograft interbody arthrodesis. 2. C3, C4, C5 titanium anterior screw plat fixation. 3. C3-C4, C4-C5, C5-C6 TETRAfuse interbody prosthesis.  Surgeon: Dr. Gatito Bejarano M.D. [de-identified] : 05/11/2022 [de-identified] : 14 [de-identified] : 3 [de-identified] : The patient stated that he is having some posterior neck pain and tightness.  He has been receiving physical therapy and stated that at first he was given massage therapy which caused more pain, however, he is now feeling better.  He has been back to work as a .  The patient denies any pain, tingling or numbness of his extremities.  He has had weakness of his right deltoid and biceps due to atrophy which he is working on strengthening in physical therapy.

## 2022-05-26 NOTE — ASSESSMENT
[FreeTextEntry1] : The left anterior cervical incision was cleaned and wound care was discussed.  The patient may shower and use soap and water and rinse and pat dry gently.  He is to leave the incision uncovered but was told to cover the incision if he goes out into the sun.  Activity levels and proper body mechanics were discussed. He was told to avoid bending and lifting.  A bone stimulator was ordered.  The patient is to return to the office in 2 weeks with new cervical spine x rays for Dr. Bejarano to review.

## 2022-05-26 NOTE — PHYSICAL EXAM
[General Appearance - Alert] : alert [General Appearance - In No Acute Distress] : in no acute distress [General Appearance - Well Nourished] : well nourished [General Appearance - Well Developed] : well developed [General Appearance - Well-Appearing] : healthy appearing [] : normal voice and communication [Clean] : clean [Dry] : dry [Healing Well] : healing well [Intact] : intact [No Drainage] : without drainage [Normal Skin] : normal [Erythema] : not erythematous [Warm] : not warm [Normal Skin Turgor] : skin turgor was normal [FreeTextEntry1] : left anterior cervical  [Person] : oriented to person [Place] : oriented to place [Time] : oriented to time [Short Term Intact] : short term memory intact [Remote Intact] : remote memory intact [Span Intact] : the attention span was normal [Concentration Intact] : normal concentrating ability [Fluency] : fluency intact [Comprehension] : comprehension intact [Current Events] : adequate knowledge of current events [Past History] : adequate knowledge of personal past history [Vocabulary] : adequate range of vocabulary [Cranial Nerves Optic (II)] : visual acuity intact bilaterally,  pupils equal round and reactive to light [Cranial Nerves Oculomotor (III)] : extraocular motion intact [Cranial Nerves Trigeminal (V)] : facial sensation intact symmetrically [Cranial Nerves Facial (VII)] : face symmetrical [Cranial Nerves Vestibulocochlear (VIII)] : hearing was intact bilaterally [Cranial Nerves Glossopharyngeal (IX)] : tongue and palate midline [Cranial Nerves Accessory (XI - Cranial And Spinal)] : head turning and shoulder shrug symmetric [Cranial Nerves Hypoglossal (XII)] : there was no tongue deviation with protrusion [Motor Tone] : muscle tone was normal in all four extremities [Motor Strength] : muscle strength was normal in all four extremities [No Muscle Atrophy] : normal bulk in all four extremities [3] : C5 Biceps 3/5 [4] : C7 extensor digitorum longus 4/5 [5] : S1 flexor hallucis longus 5/5 [Sensation Tactile Decrease] : light touch was intact [Abnormal Walk] : normal gait [Balance] : balance was intact [Past-pointing] : there was no past-pointing [Tremor] : no tremor present [2+] : Ankle jerk left 2+ [___] : absent on the right [___] : absent on the left [Henley] : Henley's sign was not demonstrated [FreeTextEntry6] : The patient has atrophy of his right deltoid and bicep muscles.

## 2022-06-09 NOTE — ED ADULT NURSE NOTE - CAS TRG GEN SKIN CONDITION
"  Quinn Lala  [id number]  : 1945  Today's Date: 2022   Patient's Email: [email or web address]          Patient Care Team:  Suzanne Harvey MD as PCP - General (Family Practice)  Kirti Perla MD (Ophthalmology)  Raymundo Villanueva MD (Internal Medicine)  Samson Zuluaga DPM (Podiatry)  Felicia Gutierrez MD as Cardiologist (Internal Medicine- Interventional Cardiology)    ""2019 Dr Bg Schreiber. Zoltan Dunne office visit note ASSESSMENT AND PLAN:NITHYA Lala is a 75year old male with the following cardiovascular profile:Â   Middle aged man with established cardiovascular disease. He is mildly reduced LV systolic function and mildly elevated filling pressures. He remains NYHA functional class II. His graft anatomy is stable and there is no role for percutaneous intervention at this time. Recent ambulatory blood pressure monitor showed excellent blood pressure control. Certainly has an element of whitecoat hypertension. His diagnosis has been discussed with him in detail. Recent events with viral infection noted about. Short bout of paroxysmal A. fib noted\""    OVERVIEW / CC 6/3/2020:  Mr. Franck Ceballos is a 65year old male, referred to establish with new cardiologist.   HISTORY OF PRESENT ILLNESS:    Patient indicates on intake nO SYMPTOMS EXCEPT MILD ORTHOSTASIS. Risk factors:  SEE TABLE BELOW  I cannot elicit a history of angina or equivalents, RESTREPO, SOB, PND, orthopnea, or arrythmias, intermittent claudication or Symptoms of PVD. - ACD    6/3/2020   Summary: \""HF\"" Need OMRON BP cuff Michelle Sanabria RN will instruct. Remote CAB, Class 1, no angnia or equivalents, neg cath recently (one minor lesion at SVG anastomosis). Change furosemide to 40 on Monday, Wednesday, & Friday  Only given mild orthostasis and mild azotemia. He will measure 2 days of BP readings and we will review next visit.     2020 BP and med adjustment, MR, HFpEF Summary:  Excellent BP response! (see below) The only issue is Bps " 100-110 in mid to late afternoon causing fatigue. He may not need BOTH diuretics, after decrease in furosemide to 36 M W F only, now cut back to only 20 M W F .  IF Bps remain <110 in the afternoons, he will drop spironolactone to T TH S S (thus alternating with furosemide). Echo to reassess  (to moderate 2019) Sept/Oct this year,but I can see him in 1 year unless problems develop. Else: See right column, above      6/9/2022  Interval CV assessment. New or worsening: *See below. No angina or equivalents, RESTREPO/SOB, PND, orthopnea, arrythmias, intermittent claudication or PVD symptoms unless noted - ACD      COMPLIANT WITH MEDICATIONS     Current Outpatient Medications   Medication Sig Note Last Dose   â¢ fluocinonide (LIDEX) 0.05 % gel Apply to areas of lupus BID  Taking at Unknown time   â¢ allopurinol (ZYLOPRIM) 100 MG tablet TAKE 1 TABLET DAILY  Taking at Unknown time   â¢ doxycycline monohydrate (MONODOX) 50 MG capsule TAKE 1 CAPSULE DAILY  Taking at Unknown time   â¢ labetalol (NORMODYNE) 200 MG tablet TAKE 2 TABLETS TWICE A DAY (Patient taking differently: TAKE 2 TABLETS in the am and one tablet in the pm)  Taking at Unknown time   â¢ spironolactone (ALDACTONE) 25 MG tablet TAKE 1 TABLET DAILY (Patient taking differently: Take 3 times a week. Mondays, Wednesdays and Fridays)  Taking at Unknown time   â¢ gabapentin (NEURONTIN) 300 MG capsule TAKE 2 CAPSULES BY MOUTH THREE TIMES DAILY. DO NOT. START BEFORE MARCH 27, 2022.  DO NOT DISPENSE UNTIL ON OR AFTER: 03/27/2022  Taking at Unknown time   â¢ ketoconazole (NIZORAL) 2 % shampoo LATHER SHAMPOO ON SCALP TWICE WEEKLY, ALLOW 10 MINUTES OF CONTACT TIME, THEN RINSE  Taking at Unknown time   â¢ pantoprazole (PROTONIX) 40 MG tablet TAKE 1 TABLET DAILY  Taking at Unknown time   â¢ pravastatin (PRAVACHOL) 40 MG tablet TAKE 1 TABLET DAILY  Taking at Unknown time   â¢ amLODIPine (NORVASC) 5 MG tablet TAKE 1 TABLET DAILY  Taking at Unknown time   â¢ tamsulosin (FLOMAX) 0.4 MG Cap TAKE 1 CAPSULE DAILY AFTER A MEAL  Taking at Unknown time   â¢ finasteride (PROSCAR) 5 MG tablet TAKE 1 TABLET DAILY  Taking at Unknown time   â¢ metFORMIN (GLUCOPHAGE) 500 MG tablet TAKE 1 TABLET TWICE A DAY WITH MEALS  Taking at Unknown time   â¢ furosemide (LASIX) 20 MG tablet TAKE AS INSTRUCTED BY YOUR PRESCRIBER (DUE FOR FOLLOW UP. SCHEDULE WITH DR. Ted Otto FOR FURTHER REFILLS) (Patient taking differently: 3 days a week. Takes on Tuesday, Thursday, Saturday) 4/4/2022: Currently finishing a 2 week episode of daily furosemide, to be completed on 04/07/2022 Taking at Unknown time   â¢ fluticasone (FLONASE) 50 MCG/ACT nasal spray USE 1 SPRAY IN EACH NOSTRIL DAILY  Taking at Unknown time   â¢ hydroxychloroquine (PLAQUENIL) 200 MG tablet TAKE 1 TABLET TWICE A DAY  Taking at Unknown time   â¢ lisinopril (ZESTRIL) 30 MG tablet TAKE 1 TABLET DAILY  Taking at Unknown time   â¢ cetirizine (ZYRTEC ALLERGY) 10 MG tablet Take 1 tablet by mouth daily. (Patient taking differently: Take 10 mg by mouth at bedtime. )  Taking at Unknown time   â¢ mometasone (ELOCON) 0.1 % lotion Apply to sides of neck and behind the ears daily until clear, then 2-3 times weekly  Taking at Unknown time   â¢ acetaminophen (TYLENOL) 650 MG CR tablet Take 650 mg by mouth every 8 hours as needed for Pain. Taking at Unknown time   â¢ triamcinolone (ARISTOCORT) 0.1 % cream Twice a day (Patient taking differently: 1 application 2 times daily. Apply two times a day to affected area.)  Taking at Unknown time   â¢ vitamin B-12 (CYANOCOBALAMIN) 1000 MCG tablet Take 1,000 mcg by mouth 3 days a week. 2/3/2022: Takes on MWF Taking at Unknown time   â¢ Ferrous Gluconate (IRON 27 PO) Take by mouth 3 days a week. Takes Monday, Wednesday and Friday  Taking at Unknown time   â¢ sildenafil (VIAGRA) 100 MG tablet Take 0.5 tablets by mouth as needed for Erectile Dysfunction.  4/4/2022: Last use ~ 3 weeks ago, early mid March, 2022 Taking at Unknown time   â¢ aspirin 81 MG tablet Take 81 mg by mouth daily. Taking at Unknown time   â¢ cholecalciferol (VITAMIN D3) 1000 UNITS tablet Take 1,000 Units by mouth 2 times daily. Taking at Unknown time   â¢ latanoprost (XALATAN) 0.005 % ophthalmic solution Place 1 drop into left eye nightly. Taking at Unknown time   â¢ HORSE CHESTNUT PO Take 300 mg by mouth 2 times daily. No current facility-administered medications for this visit. ALLERGIES  Allergies as of 06/09/2022 - Reviewed 06/09/2022   Allergen Reaction Noted   â¢ Chlorhexidine RASH 06/19/2014   â¢ Tetanus toxoid SHORTNESS OF BREATH and Other (See Comments) 05/11/2006       PAST MEDICAL SOCIAL & FAMILY HISTORIES    Past Medical History:   Diagnosis Date   â¢ Abnormal echocardiogram 06/06/2014 1/31/2014 Echocardiogram  Indication: pre-operative evaluation for abdominal surgery History of CAD; S/P CABG TDS: contrast used - post op paradoxical septal motion Normal LV size and systolic function LVEF = 03% Grade 2/4 diastolic dysfunction Grossly valve function os OK No pericardial effusion Unable to estimate PASP    â¢ Anxiety    â¢ Hooks's esophagus 08/24/2009   â¢ Basal cell carcinoma 04/03/2018    nose   â¢ Blood transfusion 2000   â¢ Chronic kidney disease 08/12/2014    stage 3   â¢ Chronic pain 08/02/2013    back, hip   â¢ Claustrophobia    â¢ Degenerative joint disease     gouty   â¢ Discoid lupus    â¢ Dyslipidemia 05/07/2012   â¢ Eczema    â¢ Erectile dysfunction    â¢ Fracture of phalanx of left great toe 2021   â¢ Gastritis 11/27/2018    Dr. Debora Stanley   â¢ Glaucoma - left eye    â¢ Hematoma complicating a procedure 05/03/2012    Right groin, post cath of 4/18/2012    â¢ History of cardiac catheterization 06/06/2014 4/18/2012 SUMMARY:  1. Severe 3-vessel coronary artery disease. 2. Patent bypass grafts. 3. Severe disease and bypass graft, aorta to posterior descending artery after anastomosis distally.      â¢ HTN (hypertension), benign 12/06/2011   â¢ Impaired functional mobility, balance, gait, and endurance    â¢ Liver disease, granuloma 02/05/2014   â¢ Lung nodule    â¢ Malignant neoplasm (CMS/HCC) 5 YEARSAGO    SKIN CANCER ON LT  FOREHEAD   â¢ Mitral valve regurgitation 07/16/2015    Moderate per ECHO 7/13/2015    â¢ Myocardial infarct (CMS/HCC)     6/2000---4 vessel bypass   â¢ Other specified gastritis without mention of hemorrhage 08/24/2009   â¢ Pneumonia    â¢ Postsurgical aortocoronary bypass status 04/10/2012   â¢ Schatzki's ring, Dilated 11/27/2018    Dr. Katheryn Warner   â¢ Sciatica    â¢ Syncope    â¢ Wears glasses        Past Surgical History:   Procedure Laterality Date   â¢ Anes chemosurg mohs tech 2nd stage up to 5 specmn Right 04/18/2018    Basal cell carcinoma right nasal tip   â¢ Back surgery  2/10/2011    LAMINECTOMY   â¢ Cardiac catherization  06/16/2014   â¢ Cardiac catherization  02/14/2019   â¢ Cardiac catherization  04/18/2012    Pre-op clearance for back   â¢ Cardiac surgery  06/2000    x4   â¢ Colonoscopy diagnostic  10/08/2003    Diverticulosis, otherwise examination was normal, f/u 10 yrs   â¢ Colonoscopy diagnostic  10-    nml   â¢ Dexa bone density axial skeleton  05/21/2010   â¢ Egd  07/10/2020    repeat EGD 3 yr, hx Hooks's, Dr. Kaden Davidson   â¢ Esophagogastroduodenoscopy  6-2016    normal. repeat 3-5 yrs   â¢ Esophagogastroduodenoscopy transoral flex w/bx single or mult  08/24/2009    short seg Barretts , gastritis, Dr Castro Blue  1yr   â¢ Esophagogastroduodenoscopy transoral flex w/bx single or mult  06/02/2008    H pylori results not in chart-Esophageal mucosal changes consistent with short segment barretts esophagus, normal stomach, normal duodenum   â¢ Esophagogastroduodenoscopy transoral flex w/bx single or mult  11/12/2007    H pylori negative-Distal esophagus bx-chronic esophagitis with intestinal metaplasia, focal glandular atypia, indefinite for low grade dysplasia   â¢ Esophagogastroduodenoscopy transoral flex w/bx single or mult  06/05/2006    H pylori negative-Esophagus, lower/distal-barretts esophagus, no dysplasia is seen chronic inflammation of gastric type mucosa, esophageal mucosa with mild nonspecific reactive changes   â¢ Esophagogastroduodenoscopy transoral flex w/bx single or mult  10-5-2010    barretts - sht segment   â¢ Esophagogastroduodenoscopy transoral flex w/bx single or mult  10-    SS Barretts, no dysplasia   â¢ Esophagogastroduodenoscopy transoral flex w/bx single or mult  2013    grossly nml esoph , gastritis   â¢ Esophagogastroduodenoscopy transoral flex w/bx single or mult  2018    Dr. Vinicius Black EGD/ Schatzki's Ring dilated and gastritis Recall 2 years   â¢ Esophagoscopy transoral flex w/bx  3-    grossly nml.   Bxs at eg jx for barretts, 1 yr recall   â¢ Esophagoscopy,ablation tumor  2012    SS Barretts - 30 ablations   â¢ Eye surgery Left     Retinal peel   â¢ Eye surgery Left     Left Cataract Extraction w/ IOL implant ~ 1 month after membrane peel   â¢ Eye surgery Right ~    Right Cataract Extraction w/ IOL implant   â¢ Mri      Multiple   â¢ Paravertebral facet inj jnt lumbar sacral 1  2018    Lumbar Facet / MBB #1   â¢ Removal of tonsils,<10 y/o  1951    Tonsillectomy Alone   â¢ Rev upper eyelid w excess skin  2006    Blepharoplasty, bilateral with an eyebrow tuck   â¢ Rev upper eyelid w excess skin  2007    Blepharoplasty, right eye   â¢ Spine surgery procedure unlisted  2003    micro diskectomy of S2; knicked S1 nerve root   â¢ Thoracentesis     â¢ Tonsillectomy and adenoidectomy  AGE 5       Family History   Problem Relation Age of Onset   â¢ Stroke Father    â¢ Heart disease Mother    â¢ Diabetes Sister    â¢ Cancer Sister         breast   â¢ Heart disease Sister    â¢ Diabetes Other    â¢ Cancer Other 36         of melanoma       Social History     Occupational History   â¢ Occupation: retired-   Tobacco Use   â¢ Smoking status: Former Smoker     Packs/day: 2.00     Years: 22.00 "    Pack years: 44.00     Types: Cigarettes     Start date: 45     Quit date:      Years since quittin.4   â¢ Smokeless tobacco: Never Used   Vaping Use   â¢ Vaping Use: never used   Substance and Sexual Activity   â¢ Alcohol use: Yes   â¢ Drug use: No   â¢ Sexual activity: Not on file       REVIEW OF SYSTEMS   Denies bleeding from any orifice or system  = no contraindication to antiplatelet or anti-thrombotics unless noted:   Verbally, I reviewed my usual ROS intake questionnaire with the patient and there were no new findings / interval admissions.        Wt Readings from Last 4 Encounters:   22 103.4 kg (228 lb)   22 103 kg (227 lb 1.2 oz)   22 103.5 kg (228 lb 3.2 oz)   22 103.3 kg (227 lb 11.2 oz)       PHYSICAL EXAM    /68   Pulse 66   Ht 6' 1"" (1.854 m)   Wt 103.4 kg (228 lb)   BMI 30.08 kg/mÂ²   BSA 2.27 mÂ²           Lab Results   Component Value Date    SODIUM 134 (L) 2022    POTASSIUM 5.1 2022    CHLORIDE 101 2022    CO2 26 2022    ANIONGAP 12 2022    GLUCOSE 130 (H) 2022    BUN 14 2022    CREATININE 1.05 2022    GFRNA 69 2019    GFRA 80 2019    CALCIUM 9.4 2022    BILIRUBIN 0.4 2022    AST 20 2022    GPT 24 2022    ALKPT 50 2022    TOTPROTEIN 6.2 (L) 2022    ALBUMIN 3.7 2022    GLOB 2.5 2022    AGR 1.5 2022    WBC 5.4 2022    HCT 38.3 (L) 2022    HGB 12.7 (L) 2022     2022      Lab Results   Component Value Date    INR 2.0 2019     (H) 2016    RAPDTR <0.02 2019    HGBA1C 6.2 (H) 2022    TSH 2.044 2021    MALBCR 242.4 (H) 2021    URMIC 3.70 2019    URIC 4.2 2021    CHOLESTEROL 133 2022    CALCLDL 53 2022    HDL 62 2022    TRIGLYCERIDE 90 2022    NONHDL 71 2022    CHOHDL 2.1 2022          IMAGIN2012 Procedure(s): Left heart " cath,coronary angiogtaphy,visualisation of bypass grafts,Left ventriculographyÂ   Post-operative Diagnosis: RCA severe,LM mild,Circumflex severe,LAD occluded  LIMA to LAD patent,Ao o OM severe,AO to OM patent ,Ao to PDA patent severe disease in PDA after anastomosis, LVEF normal   SALOME Campos    AAA SCREEN: Negative 05/16/2016    US LOWER EXTREMITY ARTERIES DUPLEX BILATERAL 10/4/2017  1. There is a prior BC study dated 09/21/2017 that was used for comparison. 2.  Normally patent bilateral lower extremity infrainguinal arterial circulations with no hemodynamically significant stenotic lesions appreciated. Diffuse vessel calcification was noted throughout, however, without stenosis    ECHO 1/24/2019  Mild global left ventricular hypokinesis, LVEF 52 %. Grade II/IV diastolic dysfunction, elevated filling pressures. Normal right ventricular size and systolic function. Moderately increased left atrial size. Mild mitral and tricuspid valve regurgitation. CORONARY ANGIOGRAM FINDINGS: 2/14/2019 1. Severe native multivessel CAD, similar to prior 2. Patent LIMA to LAD, free radial artery to OM, VG to diagonal, and VG to PDA (moderate stenosis at distal anastamotic site)NITHYA Wheat  HEMODYNAMIC DATA BP= 160/68/106 mmHg  HR= 96 RA= 10 mmHg RV= 42/12 mmHg PAP= 41/14/26 mmHg PCWP= 18 mmHg Thermodilution Cardiac output (L/min)/cardiac index (L/min/m2) 5.57/2. 53. Mckay Cardiac output (L/min)/cardiac index (L/min/m2) 7.55/3. 44. AMBULATORY BP MONITORING 3/6/2019 Overall BP: 133/69 Average Awake BP: 134/71 Average Asleep BP: 127/62 Overall HR: 70bpm Overall Success: 100%     CT CHEST 5/13/2019 1. Groundglass opacity infiltrates involve the majority of the left upper and lower lobes, moderately involve the right lower lobe, and to a lesser extent involve the right middle and upper lobes, as outlined above. 2. Subsegmental consolidation and small effusions involve both lower lobes.     ECG 5/12/2019 NSR     ECHO 05/14/2019 Normal overall left ventricular ejection fraction with small regional wall motion abnormality Left ventricular ejection fraction, 56% Small akinetic segment in the basal inferor wall Grade II/IV diastolic dysfunction, moderately elevated filling pressures  Moderate mitral valve regurgitation  Compared to prior study from 1/29/19(images reviewed), the ejection fraction and wall motion appear similar, the degree of mitral regurgitation now appears moderate    CXR 5/15/2019 IMPRESSION: Persistent bilateral infiltrates, left greater than right. ECG 8/1/2019 Sinus rhythm with sinus arrhythmia otherwise normal EKG    Cardiac Event Monitor ordered by Dr Shaq Morgan 6/11/2019 = NOT YET SCHEDULED - AD not needed currently    5.2022  Normal left ventricular chamber size. Low Normal left ventricular systolic function with ejection fraction of 55 %. with no regional wall motion abnormalities. Normal left ventricular wall thickness. Grade II left ventricular diastolic dysfunction. Normal right ventricular chamber size. Normal right ventricular systolic function. Enlarged left atrial chamber size. Left atrial volume index of 51.1 ml/mÂ². Mild mitral valve regurgitation. Mildly dilated ascending aorta, 3.6 cm. No significant change since the prior study. CARDIOVASCULAR-RELATED DIAGNOSES    1. Nonrheumatic mitral valve regurgitation    2. Atherosclerosis of native coronary artery of native heart without angina pectoris    3. Paroxysmal atrial fibrillation (CMS/[facility])                  Cardiovascular-Related Diagnoses:                        Medical Decision-Making Today:     Severe native multivessel CAD   s/p CABG x4 [facility] 2/14/2019=Patent LIMA to LAD, free radial artery to OM, VG to diagonal, and VG to PDA  Class 1, no angnia or equivalents     Mitral Valve Regurgitation;  Moderate ; 2022 Mild MR    Heart failure with preserved ejection fraction Class 1   Paroxysmal Atrial Fibrillation CHADSVASc =6        LV Function: "56% (echo 5/2019)    Evidence of Peripheral Vascular Disease= none    Hyperlipidemia LDL (mg/dL)   Date Value   03/22/2022 53     Triglycerides (mg/dL)   Date Value   03/22/2022 90     HDL (mg/dL)   Date Value   03/22/2022 62      Hypertension  Blood pressure 133/68, pulse 66, height 6' 1"" (1.854 m), weight 103.4 kg (228 lb). CKD -stage III   Creatinine (mg/dL)   Date Value   03/22/2022 1.05        Liver disease   GOT/AST (Units/L)   Date Value   03/22/2022 20      DM Hemoglobin A1C (%)   Date Value   03/22/2022 6.2 (H)        Hx of basal cell cancer    ED    Back issues    Chronic pain    Lupus = in remission    Anxiety    HIGH RISK MEDICATIONS - none                  6/9/2022   Summary: See right column, above, all problems addressed today Echo: only mild MR. Normal EF. Last stress 2016 with CAB 2000-> PET stress now. Video visit thereafter. Paz Mcdermott MD [facility] [place]  I am grateful to Joy Roldan MD - communication today-for the privilege of seeing this patient    No scribe - I entered all info myself    Video Visit Consent: I connected the video via Zoom myself to this patient. The patient with the practice is an established patient of [facility]. The patient  is in [place] and their identity has been established. The patient  understands that we are limiting office visits due to the coronavirus pandemic and  consents to a virtual visit with charges submitted to  insurance.   >30 total minutes were spent in this encounter, including MD chart review prior to the visit and documentation thereafter. Without the patient being seen and evaluated in person, there is a risk that the information and/or assessment may be incomplete or inaccurate.      " Warm/Dry

## 2022-06-13 ENCOUNTER — APPOINTMENT (OUTPATIENT)
Dept: SPINE | Facility: CLINIC | Age: 52
End: 2022-06-13
Payer: MEDICARE

## 2022-06-13 VITALS
WEIGHT: 165 LBS | BODY MASS INDEX: 23.1 KG/M2 | HEART RATE: 56 BPM | DIASTOLIC BLOOD PRESSURE: 78 MMHG | HEIGHT: 71 IN | OXYGEN SATURATION: 94 % | SYSTOLIC BLOOD PRESSURE: 136 MMHG

## 2022-06-13 PROCEDURE — 99024 POSTOP FOLLOW-UP VISIT: CPT

## 2022-06-14 NOTE — HISTORY OF PRESENT ILLNESS
[FreeTextEntry1] : RONALD YUSUF is a 51 year old gentleman who had presented in April with complaints of progressive weakness in his right arm for the past 2 years. He had difficulty with certain tasks such as putting on his belt. The patient presented with right deltoid and bicep atrophy. His cervical spine MRI revealed severe spinal canal stenosis at C3-C6 secondary to disc osteophyte complexes. There was evidence of T2 signal change in the cord. The patient underwent a C3-C4, C4-C5, and C5-C6 ACDF on -5/11/2022.Strength is improving. Recent x-rays show no change in hardware or alignment.\par

## 2022-06-14 NOTE — ASSESSMENT
[FreeTextEntry1] : 51 year old 4 weeks post op C3-C7 ACDF. Cervical X-ray shows hardware intact and good alignment. \par BLT precautions reinforced. Activity levels and proper body mechanics were discussed. Encouraged patient to discontinue vaping. RTO in 2 months with updated cervical spine X-rays.

## 2022-06-14 NOTE — REASON FOR VISIT
[de-identified] : C3-C4, C4-C5, C5-C6 anterior cervical diskectomy with allograft autograft interbody arthrodesis\par  C3, C4,C5,C6 titanium anterior screw-plate fixation\par  C3-C4, C4-C5, C5-C6 TETRAfuse interbody prosthesis.\par  [de-identified] : 5/11/2022 [de-identified] : 4 [de-identified] : Today patient reports significant improvement with surgery. Pt states he is regaining strength in his right arm and hand. Pt is now able to hold his screw gun. Attends physical therapy 2 times a week for right arm weakness. \par Patient is pain free. Denies any difficulty swallowing. Incision is well healed.  Pt continues to vape daily. Cervical X-ray reviewed today.  [Other: _____] : [unfilled]

## 2022-06-25 NOTE — H&P PST ADULT - TEMPERATURE IN FAHRENHEIT (DEGREES F)
98.3 Hatchet Flap Text: The defect edges were debeveled with a #15 scalpel blade.  Given the location of the defect, shape of the defect and the proximity to free margins a hatchet flap was deemed most appropriate.  Using a sterile surgical marker, an appropriate hatchet flap was drawn incorporating the defect and placing the expected incisions within the relaxed skin tension lines where possible.    The area thus outlined was incised deep to adipose tissue with a #15 scalpel blade.  The skin margins were undermined to an appropriate distance in all directions utilizing iris scissors.

## 2022-08-15 ENCOUNTER — APPOINTMENT (OUTPATIENT)
Dept: SPINE | Facility: CLINIC | Age: 52
End: 2022-08-15

## 2023-01-06 ENCOUNTER — APPOINTMENT (OUTPATIENT)
Dept: ORTHOPEDIC SURGERY | Facility: CLINIC | Age: 53
End: 2023-01-06
Payer: MEDICARE

## 2023-01-06 VITALS
WEIGHT: 170 LBS | HEART RATE: 82 BPM | DIASTOLIC BLOOD PRESSURE: 79 MMHG | BODY MASS INDEX: 23.8 KG/M2 | HEIGHT: 71 IN | SYSTOLIC BLOOD PRESSURE: 168 MMHG

## 2023-01-06 DIAGNOSIS — M25.562 PAIN IN LEFT KNEE: ICD-10-CM

## 2023-01-06 PROCEDURE — 99203 OFFICE O/P NEW LOW 30 MIN: CPT

## 2023-01-06 PROCEDURE — 73564 X-RAY EXAM KNEE 4 OR MORE: CPT | Mod: LT

## 2023-01-06 NOTE — DISCUSSION/SUMMARY
[de-identified] : The patient has osteoarthritis of the left knee.  I have discussed the pathology, natural history and treatment options with him.  At this point he will take over-the-counter medication as needed.  He is encouraged to exercise and maintain the motion in his knee.  He will return if symptoms progress. 29-Jan-2021 11:48

## 2023-01-06 NOTE — HISTORY OF PRESENT ILLNESS
[de-identified] : 52 year old male   presents for initial evaluation of left knee pain worsening over the past 6 months. Denies any trauma or injury. He complains of constant sharp pain worse with bending the knee, prolonged walking or sitting. He reports that he does a lot of kneeling throughout the day and notices the knee swells from this occasionally. He has tried taking Tylenol with mild relief. He has no history of any other treatment for this. He reports a history of left knee arthroscopy roughly 15 years ago.

## 2023-01-06 NOTE — PHYSICAL EXAM
[LE] : Sensory: Intact in bilateral lower extremities [DP] : dorsalis pedis 2+ and symmetric bilaterally [PT] : posterior tibial 2+ and symmetric bilaterally [Normal] : Alert and in no acute distress [Poor Appearance] : well-appearing [Acute Distress] : not in acute distress [Obese] : not obese [de-identified] : The patient has no respiratory distress. Mood and affect are normal. The patient is alert and oriented to person, place and time.\par There is no pain with active or passive motion of the hips.  There is no tenderness of either hip.\par Examination of the knees demonstrates no swelling.  There is varus alignment of the left knee and medial tenderness.  Quadriceps and hamstring function are intact.  There is no instability of collateral or cruciate ligaments.  Range of motion 0 to 100 degrees on the left, 0 to 115 degrees on the right.  The calves are soft and nontender.  The skin is intact.  There is no lymphedema. [de-identified] : AP, lateral, tunnel and sunrise x-rays of the left knee taken today demonstrate degenerative changes worse in the medial compartment.  The single AP view of the right knee demonstrates medial compartment narrowing as well

## 2023-01-09 ENCOUNTER — APPOINTMENT (OUTPATIENT)
Dept: SPINE | Facility: CLINIC | Age: 53
End: 2023-01-09
Payer: MEDICARE

## 2023-01-12 ENCOUNTER — APPOINTMENT (OUTPATIENT)
Dept: SPINE | Facility: CLINIC | Age: 53
End: 2023-01-12
Payer: MEDICARE

## 2023-01-12 ENCOUNTER — NON-APPOINTMENT (OUTPATIENT)
Age: 53
End: 2023-01-12

## 2023-01-12 VITALS
OXYGEN SATURATION: 93 % | DIASTOLIC BLOOD PRESSURE: 84 MMHG | SYSTOLIC BLOOD PRESSURE: 159 MMHG | HEART RATE: 90 BPM | BODY MASS INDEX: 22.96 KG/M2 | HEIGHT: 71 IN | WEIGHT: 164 LBS

## 2023-01-12 DIAGNOSIS — Z98.1 ARTHRODESIS STATUS: ICD-10-CM

## 2023-01-12 PROCEDURE — 99213 OFFICE O/P EST LOW 20 MIN: CPT

## 2023-01-12 RX ORDER — GABAPENTIN 300 MG/1
300 CAPSULE ORAL AT BEDTIME
Qty: 30 | Refills: 0 | Status: DISCONTINUED | COMMUNITY
Start: 2022-05-03 | End: 2023-01-12

## 2023-01-13 NOTE — HISTORY OF PRESENT ILLNESS
[FreeTextEntry1] : 52 year old male  hx of  lumbar laminectomy x5 with course of osteomyelitis resulting from injury with construction nail presented in April, 2022 with complaints of progressive weakness in his right arm for the past 2 years. He had difficulty with certain tasks such as putting on his belt. The patient presented with right deltoid and bicep atrophy. His cervical spine MRI revealed severe spinal canal stenosis at C3-C6 secondary to disc osteophyte complexes. There was evidence of T2 signal change in the cord. The patient underwent a C3-C4, C4-C5, and C5-C6 ACDF on -5/11/2022.

## 2023-01-13 NOTE — REASON FOR VISIT
[Other: _____] : [unfilled] [FreeTextEntry1] : Mr. Quintero is here for an 8 months visit following  C3-C4, C4-C5, C5-C6 ACDF for treatment of cervical stenosis with myelopathy. Today he is doing well. He has occasional neck pain. Takes Tylenol as needed with relief. Denies any numbness or tingling in hands.  Recent x-rays show no change in hardware or alignment with progression of interbody arthrodesis and some spurring at the bottom of the construct.\par

## 2023-01-13 NOTE — ASSESSMENT
[FreeTextEntry1] : 52 year old male 8 months s/p C3-C4, C4-C5, C5-C6 ACDF. Doing well overall. Activity levels and proper body mechanics were done. The patient is to avoid heavy lifting.Follow up in 4 months with an updated cervical x-ray for review

## 2023-04-04 NOTE — DISCHARGE NOTE PROVIDER - REASON FOR ADMISSION
Spoke to patient in [facility] Ms. Hope stating that Ms. De La O Flash with give her a call from the office to schedule an appointment to get evaluated by Dipika Stone . Patient has been given the phone number to [facility] location . Patient has been informed to call [facility] to schedule an appointment . Patient has verbalized understanding. Cervical radiculopathy

## 2024-05-09 NOTE — PRE-OP CHECKLIST - 2.
PAST SURGICAL HISTORY:  S/P ablation of atrial fibrillation     
emotional support given to patient and family

## (undated) DEVICE — GLV 8 DERMAPRENE ULTRA

## (undated) DEVICE — DRSG DERMABOND PRINEO 60CM

## (undated) DEVICE — SUT MONOSOF 2-0 18" C-15

## (undated) DEVICE — SUT SOFSILK 2-0 18" TIES

## (undated) DEVICE — VENODYNE/SCD SLEEVE CALF LARGE

## (undated) DEVICE — GLV 7.5 PROTEXIS (WHITE)

## (undated) DEVICE — SUT VICRYL 3-0 18" X-1 (POP-OFF)

## (undated) DEVICE — GLV 7 PROTEXIS (WHITE)

## (undated) DEVICE — NDL SPINAL 18G X 3.5" (PINK)

## (undated) DEVICE — DRSG STERISTRIPS 0.5 X 4"

## (undated) DEVICE — WARMING BLANKET LOWER ADULT

## (undated) DEVICE — SUT ETHILON 3-0 30" FS-1

## (undated) DEVICE — DRSG TAPE HYPAFIX 4"

## (undated) DEVICE — PREP CHLORAPREP HI-LITE ORANGE 26ML

## (undated) DEVICE — MIDAS REX MR8 BALL FLUTED SM BORE 3MM X 10CM

## (undated) DEVICE — SOL IRR POUR H2O 250ML

## (undated) DEVICE — GLV 6.5 PROTEXIS (WHITE)

## (undated) DEVICE — DRAPE EQUIPMENT COVER 27"

## (undated) DEVICE — DRAIN JACKSON PRATT 3 SPRING RESERVOIR W 10FR PVC DRAIN

## (undated) DEVICE — DRAPE MAYO STAND 30"

## (undated) DEVICE — MIDAS REX MR7 LUBRICANT DIFFUSER CARTRIDGE

## (undated) DEVICE — DRAPE TOWEL BLUE 17" X 24"

## (undated) DEVICE — CANISTER SUCTION 2000CC

## (undated) DEVICE — DRILL BIT PRECISION SPINE 14MM

## (undated) DEVICE — DRAPE C ARM UNIVERSAL

## (undated) DEVICE — SUT BIOSYN 4-0 18" P-12

## (undated) DEVICE — FOLEY TRAY 16FR 5CC LTX UMETER CLOSED

## (undated) DEVICE — DRAPE 3/4 SHEET W REINFORCEMENT 56X77"

## (undated) DEVICE — MIDAS REX MR8 BALL FLUTED SM BORE 4MM X 10CM

## (undated) DEVICE — POSITIONER FOAM EGG CRATE ULNAR 2PCS (PINK)

## (undated) DEVICE — SPONGE PEANUT AUTO COUNT

## (undated) DEVICE — MARKING PEN W RULER

## (undated) DEVICE — SPECIMEN CONTAINER 100ML

## (undated) DEVICE — DRSG CURITY GAUZE SPONGE 4 X 4" 12-PLY

## (undated) DEVICE — DRSG TELFA 3 X 8

## (undated) DEVICE — PACK LUMBAR LAMI

## (undated) DEVICE — GLV 8 PROTEXIS (WHITE)

## (undated) DEVICE — DRSG MASTISOL

## (undated) DEVICE — STAPLER SKIN VISI-STAT 35 WIDE

## (undated) DEVICE — DRAPE 1/2 SHEET 40X57"

## (undated) DEVICE — SOL IRR POUR NS 0.9% 500ML

## (undated) DEVICE — BIPOLAR FORCEP SYMMETRY BAYONET 7" X 1.5MM SMOOTH (SILVER)

## (undated) DEVICE — SYR LUER LOK 20CC